# Patient Record
Sex: MALE | Race: WHITE | NOT HISPANIC OR LATINO | Employment: UNEMPLOYED | URBAN - NONMETROPOLITAN AREA
[De-identification: names, ages, dates, MRNs, and addresses within clinical notes are randomized per-mention and may not be internally consistent; named-entity substitution may affect disease eponyms.]

---

## 2017-01-16 ENCOUNTER — TRANSFERRED RECORDS (OUTPATIENT)
Dept: HEALTH INFORMATION MANAGEMENT | Facility: HOSPITAL | Age: 32
End: 2017-01-16

## 2017-01-29 ENCOUNTER — HOSPITAL ENCOUNTER (EMERGENCY)
Facility: HOSPITAL | Age: 32
Discharge: HOME | End: 2017-01-29
Attending: PHYSICIAN ASSISTANT | Admitting: PHYSICIAN ASSISTANT
Payer: COMMERCIAL

## 2017-01-29 VITALS
HEART RATE: 85 BPM | TEMPERATURE: 98 F | DIASTOLIC BLOOD PRESSURE: 97 MMHG | OXYGEN SATURATION: 95 % | SYSTOLIC BLOOD PRESSURE: 176 MMHG | RESPIRATION RATE: 16 BRPM | HEIGHT: 72 IN

## 2017-01-29 DIAGNOSIS — G43.001 MIGRAINE WITHOUT AURA AND WITH STATUS MIGRAINOSUS, NOT INTRACTABLE: ICD-10-CM

## 2017-01-29 PROCEDURE — 25000128 H RX IP 250 OP 636: Performed by: PHYSICIAN ASSISTANT

## 2017-01-29 PROCEDURE — 96374 THER/PROPH/DIAG INJ IV PUSH: CPT

## 2017-01-29 PROCEDURE — 99284 EMERGENCY DEPT VISIT MOD MDM: CPT | Performed by: PHYSICIAN ASSISTANT

## 2017-01-29 PROCEDURE — 96375 TX/PRO/DX INJ NEW DRUG ADDON: CPT

## 2017-01-29 PROCEDURE — 99284 EMERGENCY DEPT VISIT MOD MDM: CPT | Mod: 25

## 2017-01-29 PROCEDURE — 25000125 ZZHC RX 250: Performed by: PHYSICIAN ASSISTANT

## 2017-01-29 PROCEDURE — 96361 HYDRATE IV INFUSION ADD-ON: CPT

## 2017-01-29 RX ORDER — DIPHENHYDRAMINE HYDROCHLORIDE 50 MG/ML
25 INJECTION INTRAMUSCULAR; INTRAVENOUS ONCE
Status: COMPLETED | OUTPATIENT
Start: 2017-01-29 | End: 2017-01-29

## 2017-01-29 RX ORDER — SODIUM CHLORIDE 9 MG/ML
1000 INJECTION, SOLUTION INTRAVENOUS CONTINUOUS
Status: DISCONTINUED | OUTPATIENT
Start: 2017-01-29 | End: 2017-01-29 | Stop reason: HOSPADM

## 2017-01-29 RX ORDER — KETOROLAC TROMETHAMINE 30 MG/ML
30 INJECTION, SOLUTION INTRAMUSCULAR; INTRAVENOUS ONCE
Status: COMPLETED | OUTPATIENT
Start: 2017-01-29 | End: 2017-01-29

## 2017-01-29 RX ADMIN — DIPHENHYDRAMINE HYDROCHLORIDE 25 MG: 50 INJECTION, SOLUTION INTRAMUSCULAR; INTRAVENOUS at 17:56

## 2017-01-29 RX ADMIN — KETOROLAC TROMETHAMINE 30 MG: 30 INJECTION, SOLUTION INTRAMUSCULAR; INTRAVENOUS at 17:55

## 2017-01-29 RX ADMIN — SODIUM CHLORIDE 1000 ML: 9 INJECTION, SOLUTION INTRAVENOUS at 17:55

## 2017-01-29 RX ADMIN — PROCHLORPERAZINE EDISYLATE 10 MG: 5 INJECTION INTRAMUSCULAR; INTRAVENOUS at 17:56

## 2017-01-29 ASSESSMENT — ENCOUNTER SYMPTOMS
ABDOMINAL PAIN: 0
LIGHT-HEADEDNESS: 0
BRUISES/BLEEDS EASILY: 0
SHORTNESS OF BREATH: 0
PHOTOPHOBIA: 1
NAUSEA: 1
FEVER: 0
CHILLS: 0
NUMBNESS: 0
VOMITING: 1
CHEST TIGHTNESS: 0
NECK PAIN: 0
ACTIVITY CHANGE: 0
WEAKNESS: 0
DIZZINESS: 0
BACK PAIN: 0
HEADACHES: 1

## 2017-01-29 NOTE — ED NOTES
Pt c/o a migraine that started this AM. Pt states that he gets migraines 3-4x/year and this migraine is no different than previous. Pt states he has been vomiting and dizzy. Pt took 3 ASA today without relief.

## 2017-01-29 NOTE — ED PROVIDER NOTES
"  History     Chief Complaint   Patient presents with     Headache     \"I get migraines 3-4 times a year\". \"Usually just a shot in the butt helps it\". States nauseated and some vomiting from migraine.     Patient is a 31 year old male presenting with headaches. The history is provided by the patient.   Headache  Pain location:  L temporal and frontal  Quality:  Sharp  Radiates to:  Does not radiate  Severity currently:  8/10  Timing:  Constant  Progression:  Unchanged  Chronicity:  Recurrent  Similar to prior headaches: yes    Relieved by:  Aspirin  Worsened by:  Light  Associated symptoms: nausea, photophobia and vomiting    Associated symptoms: no abdominal pain, no back pain, no congestion, no dizziness, no fever, no neck pain, no numbness and no weakness      Hong Murillo is a 31 year old male who presented to the ED ambulatory for evaluation of a migraine headache.  Began around noon.  Left frontal. Typical for his usual headaches.  Tells me that when he waits to long he starts to vomit.  No falls.  No blood thinners.  No fevers or neck pain.      I have reviewed the Medications, Allergies, Past Medical and Surgical History, and Social History in the Epic system.    Review of Systems   Constitutional: Negative for fever, chills and activity change.   HENT: Negative for congestion.    Eyes: Positive for photophobia. Negative for visual disturbance.   Respiratory: Negative for chest tightness and shortness of breath.    Cardiovascular: Negative for chest pain.   Gastrointestinal: Positive for nausea and vomiting. Negative for abdominal pain.   Genitourinary: Negative.    Musculoskeletal: Negative for back pain and neck pain.   Skin: Negative.    Neurological: Positive for headaches. Negative for dizziness, weakness, light-headedness and numbness.   Hematological: Does not bruise/bleed easily.       Physical Exam   BP: 141/96 mmHg  Heart Rate: 100  Temp: 97.8  F (36.6  C)  Resp: 18  Height: 182.9 cm " (6')  SpO2: 98 %  Physical Exam   Constitutional: He is oriented to person, place, and time. He appears well-developed and well-nourished. No distress.   HENT:   Head: Normocephalic and atraumatic.   Mouth/Throat: Oropharynx is clear and moist.   Eyes: Conjunctivae and EOM are normal. Pupils are equal, round, and reactive to light.   Neck: Normal range of motion. Neck supple.   Cardiovascular: Normal rate and regular rhythm.    Pulmonary/Chest: Effort normal.   Abdominal: Soft. There is no tenderness.   Lymphadenopathy:     He has no cervical adenopathy.   Neurological: He is alert and oriented to person, place, and time.   No focal findings    Skin: Skin is warm and dry.   Psychiatric: He has a normal mood and affect.   Nursing note and vitals reviewed.      ED Course   Procedures           Medications   0.9% sodium chloride BOLUS (0 mLs Intravenous Stopped 1/29/17 1853)   prochlorperazine (COMPAZINE) injection 10 mg (10 mg Intravenous Given 1/29/17 1756)   diphenhydrAMINE (BENADRYL) injection 25 mg (25 mg Intravenous Given 1/29/17 1756)   ketorolac (TORADOL) injection 30 mg (30 mg Intravenous Given 1/29/17 1755)     Critical Care time:  none               Labs Ordered and Resulted from Time of ED Arrival Up to the Time of Departure from the ED - No data to display    Assessments & Plan (with Medical Decision Making)   Complete resolution of his symptoms after migraine cocktail.  He would like to go home. This is certainly reasonable.  No red flags.  Migraine is exactly like his previous. Discharged in stable and good condition, ambulatory without assistance.     I have reviewed the nursing notes.    I have reviewed the findings, diagnosis, plan and need for follow up with the patient.    Discharge Medication List as of 1/29/2017  7:00 PM          Final diagnoses:   Migraine without aura and with status migrainosus, not intractable       1/29/2017   [facility]      Alicia Youngblood PA-C  01/29/17  1849    Alicia Youngblood PA-C  02/01/17 1116

## 2017-01-29 NOTE — ED AVS SNAPSHOT
[facility]    [address] [address]    Phone:  [phone]                                       Hong Murillo   MRN: [id number]    Department:  [facility]   Date of Visit:  1/29/2017           Patient Information     Date Of Birth          1985        Your diagnoses for this visit were:     Migraine without aura and with status migrainosus, not intractable        You were seen by Alicia Youngblood PA-C.      Follow-up Information     Follow up with Clinic, [facility].    Why:  As needed    Contact information:    [address]  [facility] [address] [phone]          Follow up with [facility].    Specialty:  EMERGENCY MEDICINE    Why:  If symptoms worsen    Contact information:    [address] [address] [phone]    Additional information:    From [facility]: Take [address]. Turn left at [address]. Turn left at the first stoplight on [address]. At the first stop sign, take a right onto [address]. Take a left into the parking lot and continue through until you reach the North enterance of the building.       From [place]: Take [address]. Take the [address] ramp towards [place]. Turn left onto [address]. Take a slight right onto [address]. Turn left at the first stoplight on [address]. At the first stop sign, take a right onto [address]. Take a left into the parking lot and continue through until you reach the North enterance of the building.       From [place]: Take [address]. Take a right at [address]. At the first stop sign, take a right onto [address]. Take a left into the parking lot and continue through until you reach the North enterance of the building.         Discharge Instructions         * Migraine Headache  Migraine headaches are related to changes in blood flow to the brain. This causes  throbbing or constant pain on one or both sides of the head. The pain may last from a few hours to several days. There is usually nausea, vomiting, sensitivity to light and sound, and blurred vision. A migraine attack may be triggered by emotional stress, hormone changes during the menstrual cycle, oral contraceptives, alcohol use, certain foods containing tyramine, eye strain, weather changes, missing meals, or too little or too much sleep.  Home Care For This Headache:  1) If you were given pain medicine for this headache, do not drive yourself home . Arrange for a ride, instead. When you get home, try to sleep. You should feel much better when you wake up.  2) Migraine headaches may improve with an ice pack on the forehead or at the base of the skull. Heat to the back of your neck may relieve any neck spasm.  3) Drink only clear liquids or eat a very light diet to avoid nausea/vomiting until symptoms improve.  Preventing Future Headaches:  1) Pay attention to those factors that seem to trigger your headache. Try to avoid them when you can. If you have frequent headaches, it is useful to keep a diary of what you were doing, feeling or eating in the hours before each attack. Show this to your doctor to help find the cause of your headaches.  a) If you feel that stress is a factor in your headaches, look at the sources of stress in your life. Find ways to release the build-up of those stresses by using regular exercise, relaxation methods (yoga, meditation), bio-feedback or simply taking time-out for yourself. For more information about this, consult your doctor or go to a local bookstore and review books and tapes on this subject.  b) Tyramine is a substance present in the following foods : chocolate, yogurt, all cheeses except cottage cheese and cream cheese. smoked or pickled fish and meat (including herring, caviar, bologna, pepperoni, salami), liver, avocados, bananas, figs, raisins, and red wine. Be aware that  these foods may trigger a migraine in some persons. Try taking these foods out of your diet for 1-2 months to see if this reduces headache frequency.  Treating Future Attacks:  1) At the first sign of a migraine headache, take a medicine to stop it if one has been prescribed for you. If not, take acetaminophen (Tylenol) or ibuprofen (Motrin, Advil) if you are able to take these. The sooner you take medicine, the better it will work.  2) You may also want to find a quiet, dark, comfortable place to sit or lie down. Let yourself relax or sleep.  3) An ice pack on the forehead or area of greatest pain may also help.  Follow Up  with your doctor if the headache is not better within the next 24 hours. If you have frequent headaches you should discuss a treatment plan with your primary care doctor. Ask if you can have medicine to take at home the next time you get a bad headache. Poorly controlled chronic headaches may require a referral to a neurologist (headache specialist).  Get Prompt Medical Attention  if any of the following occur:    Your head pain gets worse, or does not improve within 24 hours    Repeated vomiting (can t keep liquids down)    Sinus or ear or throat pain (not already reported)    Fever of 101  F (38.3  C) or higher, or as directed by your healthcare provider    Stiff neck    Extreme drowsiness, confusion or fainting    Weakness of an arm or leg or one side of the face    Difficulty with speech or vision    6828-6672 [employer]. [address] [place] [address]. All rights reserved. This information is not intended as a substitute for professional medical care. Always follow your healthcare professional's instructions.        Follow-up in the clinic as needed.    Return here for ANY other concerns or questions.     Go home and rest.     Future Appointments        Provider Department Dept Phone Center    2/8/2017 9:00 AM Néstor Rene MD  ORTHOPEDICS [phone]  [facility]         Review of your medicines      Our records show that you are taking the medicines listed below. If these are incorrect, please call your family doctor or clinic.        Dose / Directions Last dose taken    LORazepam 1 MG tablet   Commonly known as:  ATIVAN   Dose:  1 mg   Quantity:  30 tablet        Take 1 tablet (1 mg) by mouth every 8 hours as needed for anxiety   Refills:  0        methimazole 5 MG tablet   Commonly known as:  TAPAZOLE   Dose:  5 mg   Quantity:  90 tablet        Take 1 tablet (5 mg) by mouth 3 times daily   Refills:  3        metoprolol 25 MG tablet   Commonly known as:  LOPRESSOR   Dose:  25 mg   Quantity:  60 tablet        Take 1 tablet (25 mg) by mouth 2 times daily   Refills:  1        sertraline 50 MG tablet   Commonly known as:  ZOLOFT   Quantity:  30 tablet        Take 1/2 pill for 4 days then one pill daily there after.   Refills:  0        sulindac 200 MG tablet   Commonly known as:  CLINORIL   Dose:  200 mg   Quantity:  60 tablet        Take 1 tablet (200 mg) by mouth 2 times daily (with meals)   Refills:  1        TYLENOL PO   Dose:  1000 mg        Take 1,000 mg by mouth   Refills:  0                Procedures and tests performed during your visit     Peripheral IV catheter      Orders Needing Specimen Collection     None      Pending Results     No orders found from 1/28/2017 to 1/30/2017.            Pending Culture Results     No orders found from 1/28/2017 to 1/30/2017.            Thank you for choosing [facility]       Thank you for choosing [facility] for your care. Our goal is always to provide you with excellent care. Hearing back from our patients is one way we can continue to improve our services. Please take a few minutes to complete the written survey that you may receive in the mail after you visit with us. Thank you!        [employer] Information     [employer] lets you send messages to your doctor, view your test results, renew your prescriptions, schedule  "appointments and more. To sign up, go to [email or web address] . Click on \"Log in\" on the left side of the screen, which will take you to the Welcome page. Then click on \"Sign up Now\" on the right side of the page.     You will be asked to enter the access code listed below, as well as some personal information. Please follow the directions to create your username and password.     Your access code is: [email or web address]  Expires: 3/12/2017 10:00 AM     Your access code will  in 90 days. If you need help or a new code, please call your [place] clinic or [phone].        Care EveryWhere ID     This is your Care EveryWhere ID. This could be used by other organizations to access your [place] medical records  [id number]        After Visit Summary       This is your record. Keep this with you and show to your community pharmacist(s) and doctor(s) at your next visit.                  "

## 2017-01-29 NOTE — ED AVS SNAPSHOT
[facility]    [address] [address]    Phone:  [phone]                                       Hong Murillo   MRN: [id number]    Department:  [facility]   Date of Visit:  1/29/2017           After Visit Summary Signature Page     I have received my discharge instructions, and my questions have been answered. I have discussed any challenges I see with this plan with the nurse or doctor.    ..........................................................................................................................................  Patient/Patient Representative Signature      ..........................................................................................................................................  Patient Representative Print Name and Relationship to Patient    ..................................................               ................................................  Date                                            Time    ..........................................................................................................................................  Reviewed by Signature/Title    ...................................................              ..............................................  Date                                                            Time

## 2017-01-30 NOTE — DISCHARGE INSTRUCTIONS
* Migraine Headache  Migraine headaches are related to changes in blood flow to the brain. This causes throbbing or constant pain on one or both sides of the head. The pain may last from a few hours to several days. There is usually nausea, vomiting, sensitivity to light and sound, and blurred vision. A migraine attack may be triggered by emotional stress, hormone changes during the menstrual cycle, oral contraceptives, alcohol use, certain foods containing tyramine, eye strain, weather changes, missing meals, or too little or too much sleep.  Home Care For This Headache:  1) If you were given pain medicine for this headache, do not drive yourself home . Arrange for a ride, instead. When you get home, try to sleep. You should feel much better when you wake up.  2) Migraine headaches may improve with an ice pack on the forehead or at the base of the skull. Heat to the back of your neck may relieve any neck spasm.  3) Drink only clear liquids or eat a very light diet to avoid nausea/vomiting until symptoms improve.  Preventing Future Headaches:  1) Pay attention to those factors that seem to trigger your headache. Try to avoid them when you can. If you have frequent headaches, it is useful to keep a diary of what you were doing, feeling or eating in the hours before each attack. Show this to your doctor to help find the cause of your headaches.  a) If you feel that stress is a factor in your headaches, look at the sources of stress in your life. Find ways to release the build-up of those stresses by using regular exercise, relaxation methods (yoga, meditation), bio-feedback or simply taking time-out for yourself. For more information about this, consult your doctor or go to a local bookstore and review books and tapes on this subject.  b) Tyramine is a substance present in the following foods : chocolate, yogurt, all cheeses except cottage cheese and cream cheese. smoked or pickled fish and meat (including herring,  caviar, bologna, pepperoni, salami), liver, avocados, bananas, figs, raisins, and red wine. Be aware that these foods may trigger a migraine in some persons. Try taking these foods out of your diet for 1-2 months to see if this reduces headache frequency.  Treating Future Attacks:  1) At the first sign of a migraine headache, take a medicine to stop it if one has been prescribed for you. If not, take acetaminophen (Tylenol) or ibuprofen (Motrin, Advil) if you are able to take these. The sooner you take medicine, the better it will work.  2) You may also want to find a quiet, dark, comfortable place to sit or lie down. Let yourself relax or sleep.  3) An ice pack on the forehead or area of greatest pain may also help.  Follow Up  with your doctor if the headache is not better within the next 24 hours. If you have frequent headaches you should discuss a treatment plan with your primary care doctor. Ask if you can have medicine to take at home the next time you get a bad headache. Poorly controlled chronic headaches may require a referral to a neurologist (headache specialist).  Get Prompt Medical Attention  if any of the following occur:    Your head pain gets worse, or does not improve within 24 hours    Repeated vomiting (can t keep liquids down)    Sinus or ear or throat pain (not already reported)    Fever of 101  F (38.3  C) or higher, or as directed by your healthcare provider    Stiff neck    Extreme drowsiness, confusion or fainting    Weakness of an arm or leg or one side of the face    Difficulty with speech or vision    1847-8201 [employer]. [address] [place] [address], [place] [address]. All rights reserved. This information is not intended as a substitute for professional medical care. Always follow your healthcare professional's instructions.        Follow-up in the clinic as needed.    Return here for ANY other concerns or questions.     Go home and rest.

## 2017-01-30 NOTE — ED NOTES
Discharge instructions reviewed with patient, and patient verbalized understanding. Pt ambulated with a steady gait to the exit

## 2017-02-20 DIAGNOSIS — E05.00 GRAVES' DISEASE: Primary | ICD-10-CM

## 2017-02-20 LAB
T4 FREE SERPL-MCNC: 0.87 NG/DL (ref 0.76–1.46)
TSH SERPL DL<=0.05 MIU/L-ACNC: <0.01 MU/L (ref 0.4–4)

## 2017-02-20 PROCEDURE — 84439 ASSAY OF FREE THYROXINE: CPT | Performed by: INTERNAL MEDICINE

## 2017-02-20 PROCEDURE — 36415 COLL VENOUS BLD VENIPUNCTURE: CPT | Performed by: INTERNAL MEDICINE

## 2017-02-20 PROCEDURE — 84443 ASSAY THYROID STIM HORMONE: CPT | Performed by: INTERNAL MEDICINE

## 2017-03-06 ENCOUNTER — OFFICE VISIT (OUTPATIENT)
Dept: FAMILY MEDICINE | Facility: OTHER | Age: 32
End: 2017-03-06
Attending: PHYSICIAN ASSISTANT
Payer: COMMERCIAL

## 2017-03-06 VITALS
SYSTOLIC BLOOD PRESSURE: 118 MMHG | BODY MASS INDEX: 32.37 KG/M2 | HEART RATE: 99 BPM | OXYGEN SATURATION: 96 % | DIASTOLIC BLOOD PRESSURE: 70 MMHG | HEIGHT: 72 IN | WEIGHT: 239 LBS | TEMPERATURE: 97.8 F

## 2017-03-06 DIAGNOSIS — E05.00 GRAVES DISEASE: ICD-10-CM

## 2017-03-06 DIAGNOSIS — F41.1 GAD (GENERALIZED ANXIETY DISORDER): Primary | ICD-10-CM

## 2017-03-06 DIAGNOSIS — E05.90 HYPERTHYROIDISM: ICD-10-CM

## 2017-03-06 LAB
T4 FREE SERPL-MCNC: 0.32 NG/DL (ref 0.76–1.46)
TSH SERPL DL<=0.05 MIU/L-ACNC: 0.26 MU/L (ref 0.4–4)

## 2017-03-06 PROCEDURE — 84443 ASSAY THYROID STIM HORMONE: CPT | Performed by: INTERNAL MEDICINE

## 2017-03-06 PROCEDURE — 84439 ASSAY OF FREE THYROXINE: CPT | Performed by: INTERNAL MEDICINE

## 2017-03-06 PROCEDURE — 36415 COLL VENOUS BLD VENIPUNCTURE: CPT | Performed by: INTERNAL MEDICINE

## 2017-03-06 PROCEDURE — 99214 OFFICE O/P EST MOD 30 MIN: CPT | Performed by: PHYSICIAN ASSISTANT

## 2017-03-06 RX ORDER — LORAZEPAM 1 MG/1
1 TABLET ORAL EVERY 8 HOURS PRN
Qty: 30 TABLET | Refills: 0 | Status: SHIPPED | OUTPATIENT
Start: 2017-03-06 | End: 2023-03-09

## 2017-03-06 ASSESSMENT — ANXIETY QUESTIONNAIRES
5. BEING SO RESTLESS THAT IT IS HARD TO SIT STILL: MORE THAN HALF THE DAYS
2. NOT BEING ABLE TO STOP OR CONTROL WORRYING: MORE THAN HALF THE DAYS
GAD7 TOTAL SCORE: 18
IF YOU CHECKED OFF ANY PROBLEMS ON THIS QUESTIONNAIRE, HOW DIFFICULT HAVE THESE PROBLEMS MADE IT FOR YOU TO DO YOUR WORK, TAKE CARE OF THINGS AT HOME, OR GET ALONG WITH OTHER PEOPLE: SOMEWHAT DIFFICULT
1. FEELING NERVOUS, ANXIOUS, OR ON EDGE: NEARLY EVERY DAY
7. FEELING AFRAID AS IF SOMETHING AWFUL MIGHT HAPPEN: MORE THAN HALF THE DAYS
6. BECOMING EASILY ANNOYED OR IRRITABLE: NEARLY EVERY DAY
3. WORRYING TOO MUCH ABOUT DIFFERENT THINGS: NEARLY EVERY DAY

## 2017-03-06 ASSESSMENT — PATIENT HEALTH QUESTIONNAIRE - PHQ9: 5. POOR APPETITE OR OVEREATING: NEARLY EVERY DAY

## 2017-03-06 ASSESSMENT — PAIN SCALES - GENERAL: PAINLEVEL: NO PAIN (0)

## 2017-03-06 NOTE — PATIENT INSTRUCTIONS
Thank you for choosing [facility].   I appreciate the opportunity to serve you and look forward to supporting your healthcare needs in the future. Please contact me with any questions or concerns that you may have.    Sincerely,    Desiree Loomis RN, PA-C

## 2017-03-06 NOTE — NURSING NOTE
Chief Complaint   Patient presents with     Anxiety       Initial /70 (BP Location: Right arm, Patient Position: Chair, Cuff Size: Adult Large)  Pulse 99  Temp 97.8  F (36.6  C) (Tympanic)  Ht 6' (1.829 m)  Wt 239 lb (108.4 kg)  SpO2 96%  BMI 32.41 kg/m2 Estimated body mass index is 32.41 kg/(m^2) as calculated from the following:    Height as of this encounter: 6' (1.829 m).    Weight as of this encounter: 239 lb (108.4 kg).  Medication Reconciliation: complete     CHRISTIANE SHUKLA

## 2017-03-06 NOTE — MR AVS SNAPSHOT
"              After Visit Summary   3/6/2017    Hong Murillo    MRN: [id number]           Patient Information     Date Of Birth          1985        Visit Information        Provider Department      3/6/2017 9:15 AM Desiree Loomis PA [facility]        Today's Diagnoses     NORM (generalized anxiety disorder)    -  1    Hyperthyroidism        Graves disease          Care Instructions    Thank you for choosing [facility].   I appreciate the opportunity to serve you and look forward to supporting your healthcare needs in the future. Please contact me with any questions or concerns that you may have.    Sincerely,    Desiree Loomis RN, PA-C           Follow-ups after your visit        Who to contact     If you have questions or need follow up information about today's clinic visit or your schedule please contact [facility] directly at [phone].  Normal or non-critical lab and imaging results will be communicated to you by MyChart, letter or phone within 4 business days after the clinic has received the results. If you do not hear from us within 7 days, please contact the clinic through MyChart or phone. If you have a critical or abnormal lab result, we will notify you by phone as soon as possible.  Submit refill requests through [employer] or call your pharmacy and they will forward the refill request to us. Please allow 3 business days for your refill to be completed.          Additional Information About Your Visit        MyChart Information     [employer] lets you send messages to your doctor, view your test results, renew your prescriptions, schedule appointments and more. To sign up, go to [email or web address] . Click on \"Log in\" on the left side of the screen, which will take you to the Welcome page. Then click on \"Sign up Now\" on the right side of the page.     You will be asked to enter the access code listed below, as well as some personal information. " Please follow the directions to create your username and password.     Your access code is: [email or web address]  Expires: 3/12/2017 10:00 AM     Your access code will  in 90 days. If you need help or a new code, please call your [place] clinic or [phone].        Care EveryWhere ID     This is your Care EveryWhere ID. This could be used by other organizations to access your [place] medical records  [id number]        Your Vitals Were     Pulse Temperature Height Pulse Oximetry BMI (Body Mass Index)       99 97.8  F (36.6  C) (Tympanic) 6' (1.829 m) 96% 32.41 kg/m2        Blood Pressure from Last 3 Encounters:   17 118/70   17 176/97   16 116/60    Weight from Last 3 Encounters:   17 239 lb (108.4 kg)   16 229 lb (103.9 kg)   16 202 lb (91.6 kg)              Today, you had the following     No orders found for display         Today's Medication Changes          These changes are accurate as of: 3/6/17  9:47 AM.  If you have any questions, ask your nurse or doctor.               Stop taking these medicines if you haven't already. Please contact your care team if you have questions.     methimazole 5 MG tablet   Commonly known as:  TAPAZOLE   Stopped by:  Desiree Loomis PA                Where to get your medicines      Some of these will need a paper prescription and others can be bought over the counter.  Ask your nurse if you have questions.     Bring a paper prescription for each of these medications     LORazepam 1 MG tablet                Primary Care Provider Office Phone # Fax #    FRANCO Bronw [phone]       [facility] [address] [address]        Thank you!     Thank you for choosing [facility]  for your care. Our goal is always to provide you with excellent care. Hearing back from our patients is one way we can continue to improve our services. Please take a few minutes to complete the written  survey that you may receive in the mail after your visit with us. Thank you!             Your Updated Medication List - Protect others around you: Learn how to safely use, store and throw away your medicines at www.disposemymeds.org.          This list is accurate as of: 3/6/17  9:47 AM.  Always use your most recent med list.                   Brand Name Dispense Instructions for use    LORazepam 1 MG tablet    ATIVAN    30 tablet    Take 1 tablet (1 mg) by mouth every 8 hours as needed for anxiety

## 2017-03-06 NOTE — PROGRESS NOTES
SUBJECTIVE:                                                    Hong Murillo is a 31 year old male who presents to clinic today for the following health issues:      Anxiety Follow-Up    Status since last visit: No change- still high anxiety     Other associated symptoms:troubles sleeping, knot in stomach    Complicating factors:   Significant life event: Yes-  Death in family, moving to [place] in a couple days.    Current substance abuse: None  Depression symptoms: No  NORM-7 SCORE 12/15/2016   Total Score 15        GAD7                   Amount of exercise or physical activity: 4-5 days/week for an average of greater than 60 minutes    Problems taking medications regularly: No    Medication side effects: none  Diet: regular (no restrictions)        Problem list and histories reviewed & adjusted, as indicated.  Additional history: as documented    Patient Active Problem List   Diagnosis     Ilio-inguinal strain     Leukocytosis     Hypokalemia     Migraine without aura and with status migrainosus, not intractable     ACP (advance care planning)     History reviewed. No pertinent past surgical history.    Social History   Substance Use Topics     Smoking status: Former Smoker     Packs/day: 0.00     Years: 15.00     Smokeless tobacco: Never Used     Alcohol use No      Comment: rarely     History reviewed. No pertinent family history.      Current Outpatient Prescriptions   Medication Sig Dispense Refill     LORazepam (ATIVAN) 1 MG tablet Take 1 tablet (1 mg) by mouth every 8 hours as needed for anxiety 30 tablet 0     No Known Allergies  BP Readings from Last 3 Encounters:   03/06/17 118/70   01/29/17 176/97   12/27/16 116/60    Wt Readings from Last 3 Encounters:   03/06/17 239 lb (108.4 kg)   12/27/16 229 lb (103.9 kg)   12/22/16 202 lb (91.6 kg)                    Reviewed and updated as needed this visit by clinical staff  Tobacco  Allergies  Meds  Med Hx  Surg Hx  Fam Hx  Soc Hx      Reviewed and  updated as needed this visit by Provider         ROS:  Constitutional, neuro, ENT, endocrine, pulmonary, cardiac, gastrointestinal, genitourinary, musculoskeletal, integument and psychiatric systems are negative, except as otherwise noted.    OBJECTIVE:                                                    /70 (BP Location: Right arm, Patient Position: Chair, Cuff Size: Adult Large)  Pulse 99  Temp 97.8  F (36.6  C) (Tympanic)  Ht 6' (1.829 m)  Wt 239 lb (108.4 kg)  SpO2 96%  BMI 32.41 kg/m2  Body mass index is 32.41 kg/(m^2).  GENERAL APPEARANCE: healthy, alert and no distress  EYES: Eyes grossly normal to inspection, PERRL and conjunctivae and sclerae normal  HENT: ear canals and TM's normal and nose and mouth without ulcers or lesions  NECK: no adenopathy, no asymmetry, masses, or scars and thyroid normal to palpation  RESP: lungs clear to auscultation - no rales, rhonchi or wheezes  CV: regular rates and rhythm, normal S1 S2, no S3 or S4 and no murmur, click or rub  LYMPHATICS: normal ant/post cervical and supraclavicular nodes  ABDOMEN: soft, nontender, without hepatosplenomegaly or masses and bowel sounds normal  MS: extremities normal- no gross deformities noted  SKIN: no suspicious lesions or rashes  PSYCH: mentation appears normal and he is stressed. Not tearful today.   Discussion on Zoloft.   Emotional.  Worry overwhelms him.   Diagnostic test results:  Diagnostic Test Results:  No results found for this or any previous visit (from the past 24 hour(s)).     ASSESSMENT/PLAN:                                                    1. Hyperthyroidism  He was ablated and now waiting for his levels to change.  Saw endocrine in [place].   He is moving to [place].   Be near family.   He would like his records. We will have him see med records to get his records to go with.   - LORazepam (ATIVAN) 1 MG tablet; Take 1 tablet (1 mg) by mouth every 8 hours as needed for anxiety  Dispense: 30 tablet; Refill:  0    2. NORM (generalized anxiety disorder)  He doesn't want to take Zoloft.  He does not want to do any counseling.  He has chronic Anxiety and use of THC is seeming his most effective form of help in his anxiety in the past and since his Graves doesn't help as much.   I did refill the Ativan for the move.  He will use this as dicussed and establish once he gets settled.       See Patient Instructions    Desiree Loomis PA-C  [facility] [place]    30 minutes in visit over 50 % in counseling.

## 2017-03-07 ASSESSMENT — ANXIETY QUESTIONNAIRES: GAD7 TOTAL SCORE: 18

## 2017-03-07 ASSESSMENT — PATIENT HEALTH QUESTIONNAIRE - PHQ9: SUM OF ALL RESPONSES TO PHQ QUESTIONS 1-9: 2

## 2019-11-03 ENCOUNTER — HOSPITAL ENCOUNTER (EMERGENCY)
Facility: OTHER | Age: 34
Discharge: HOME | End: 2019-11-03
Attending: EMERGENCY MEDICINE | Admitting: EMERGENCY MEDICINE

## 2019-11-03 ENCOUNTER — APPOINTMENT (OUTPATIENT)
Dept: CT IMAGING | Facility: OTHER | Age: 34
End: 2019-11-03
Attending: EMERGENCY MEDICINE

## 2019-11-03 VITALS
BODY MASS INDEX: 31.53 KG/M2 | OXYGEN SATURATION: 96 % | RESPIRATION RATE: 20 BRPM | TEMPERATURE: 98.1 F | SYSTOLIC BLOOD PRESSURE: 119 MMHG | DIASTOLIC BLOOD PRESSURE: 97 MMHG | HEIGHT: 73 IN

## 2019-11-03 DIAGNOSIS — G44.219 EPISODIC TENSION-TYPE HEADACHE, NOT INTRACTABLE: ICD-10-CM

## 2019-11-03 LAB
ANION GAP SERPL CALCULATED.3IONS-SCNC: 8 MMOL/L (ref 3–14)
BASOPHILS # BLD AUTO: 0.1 10E9/L (ref 0–0.2)
BASOPHILS NFR BLD AUTO: 0.7 %
BUN SERPL-MCNC: 18 MG/DL (ref 7–25)
CALCIUM SERPL-MCNC: 9.6 MG/DL (ref 8.6–10.3)
CHLORIDE SERPL-SCNC: 102 MMOL/L (ref 98–107)
CO2 SERPL-SCNC: 26 MMOL/L (ref 21–31)
CREAT SERPL-MCNC: 1 MG/DL (ref 0.7–1.3)
DIFFERENTIAL METHOD BLD: NORMAL
EOSINOPHIL # BLD AUTO: 0.2 10E9/L (ref 0–0.7)
EOSINOPHIL NFR BLD AUTO: 1.7 %
ERYTHROCYTE [DISTWIDTH] IN BLOOD BY AUTOMATED COUNT: 12.2 % (ref 10–15)
GFR SERPL CREATININE-BSD FRML MDRD: 85 ML/MIN/{1.73_M2}
GLUCOSE SERPL-MCNC: 155 MG/DL (ref 70–105)
HCT VFR BLD AUTO: 43.1 % (ref 40–53)
HGB BLD-MCNC: 14.4 G/DL (ref 13.3–17.7)
IMM GRANULOCYTES # BLD: 0 10E9/L (ref 0–0.4)
IMM GRANULOCYTES NFR BLD: 0.4 %
INR PPP: 1 (ref 0–1.3)
LYMPHOCYTES # BLD AUTO: 3 10E9/L (ref 0.8–5.3)
LYMPHOCYTES NFR BLD AUTO: 31.3 %
MCH RBC QN AUTO: 30 PG (ref 26.5–33)
MCHC RBC AUTO-ENTMCNC: 33.4 G/DL (ref 31.5–36.5)
MCV RBC AUTO: 90 FL (ref 78–100)
MONOCYTES # BLD AUTO: 0.6 10E9/L (ref 0–1.3)
MONOCYTES NFR BLD AUTO: 6.2 %
NEUTROPHILS # BLD AUTO: 5.8 10E9/L (ref 1.6–8.3)
NEUTROPHILS NFR BLD AUTO: 59.7 %
PLATELET # BLD AUTO: 205 10E9/L (ref 150–450)
POTASSIUM SERPL-SCNC: 3.8 MMOL/L (ref 3.5–5.1)
RBC # BLD AUTO: 4.8 10E12/L (ref 4.4–5.9)
SODIUM SERPL-SCNC: 136 MMOL/L (ref 134–144)
TROPONIN I SERPL-MCNC: 2.9 PG/ML
WBC # BLD AUTO: 9.7 10E9/L (ref 4–11)

## 2019-11-03 PROCEDURE — 99285 EMERGENCY DEPT VISIT HI MDM: CPT | Mod: 25 | Performed by: EMERGENCY MEDICINE

## 2019-11-03 PROCEDURE — 25500064 ZZH RX 255 OP 636: Performed by: EMERGENCY MEDICINE

## 2019-11-03 PROCEDURE — 96374 THER/PROPH/DIAG INJ IV PUSH: CPT | Mod: XU | Performed by: EMERGENCY MEDICINE

## 2019-11-03 PROCEDURE — 80048 BASIC METABOLIC PNL TOTAL CA: CPT | Performed by: EMERGENCY MEDICINE

## 2019-11-03 PROCEDURE — 70498 CT ANGIOGRAPHY NECK: CPT | Mod: TC

## 2019-11-03 PROCEDURE — 36415 COLL VENOUS BLD VENIPUNCTURE: CPT | Performed by: EMERGENCY MEDICINE

## 2019-11-03 PROCEDURE — 84484 ASSAY OF TROPONIN QUANT: CPT | Performed by: EMERGENCY MEDICINE

## 2019-11-03 PROCEDURE — 85610 PROTHROMBIN TIME: CPT | Performed by: EMERGENCY MEDICINE

## 2019-11-03 PROCEDURE — 25000128 H RX IP 250 OP 636: Performed by: EMERGENCY MEDICINE

## 2019-11-03 PROCEDURE — 85025 COMPLETE CBC W/AUTO DIFF WBC: CPT | Performed by: EMERGENCY MEDICINE

## 2019-11-03 PROCEDURE — 96375 TX/PRO/DX INJ NEW DRUG ADDON: CPT | Mod: XU | Performed by: EMERGENCY MEDICINE

## 2019-11-03 PROCEDURE — 99284 EMERGENCY DEPT VISIT MOD MDM: CPT | Mod: Z6 | Performed by: EMERGENCY MEDICINE

## 2019-11-03 PROCEDURE — 25800030 ZZH RX IP 258 OP 636: Performed by: EMERGENCY MEDICINE

## 2019-11-03 RX ORDER — SODIUM CHLORIDE 9 MG/ML
INJECTION, SOLUTION INTRAVENOUS CONTINUOUS
Status: DISCONTINUED | OUTPATIENT
Start: 2019-11-03 | End: 2019-11-03 | Stop reason: HOSPADM

## 2019-11-03 RX ORDER — DIPHENHYDRAMINE HYDROCHLORIDE 50 MG/ML
25 INJECTION INTRAMUSCULAR; INTRAVENOUS ONCE
Status: COMPLETED | OUTPATIENT
Start: 2019-11-03 | End: 2019-11-03

## 2019-11-03 RX ADMIN — IOHEXOL 100 ML: 350 INJECTION, SOLUTION INTRAVENOUS at 12:37

## 2019-11-03 RX ADMIN — DIPHENHYDRAMINE HYDROCHLORIDE 25 MG: 50 INJECTION INTRAMUSCULAR; INTRAVENOUS at 11:47

## 2019-11-03 RX ADMIN — PROCHLORPERAZINE EDISYLATE 10 MG: 5 INJECTION INTRAMUSCULAR; INTRAVENOUS at 11:47

## 2019-11-03 RX ADMIN — SODIUM CHLORIDE: 9 INJECTION, SOLUTION INTRAVENOUS at 11:47

## 2019-11-03 NOTE — PROGRESS NOTES
1.  Has the patient had a previous reaction to IV contrast? No    2.  Does the patient have kidney disease? No    3.  Is the patient on dialysis? No    If YES to any of these questions, exam will be reviewed with a Radiologist before administering contrast.

## 2019-11-03 NOTE — ED PROVIDER NOTES
"[facility]  Emergency Department Visit Note    Headache      History of Present Illness     HPI:  Hong Murillo is a 34 year old male presenting with headache. These symptoms started 14 hours ago and the onset was while having sex.  The headache quality is throbbing pain, dull pain. The patient does have a history of migraines but this is not similar to his previous headaches. There is  associated nausea, vomiting, photophobia, phonophobia, but no numbness, weakness, double vision, blurry vision. For headache relief, the patient has tried nothing.  No recent trauma or illness. No fevers.    Medications:  Prior to Admission medications    Medication Sig Last Dose Taking? Auth Provider   LORazepam (ATIVAN) 1 MG tablet Take 1 tablet (1 mg) by mouth every 8 hours as needed for anxiety Unknown at Unknown time  Desiree Loomis PA       Allergies:  No Known Allergies    Problem List:  Patient Active Problem List   Diagnosis     Ilio-inguinal strain     Leukocytosis     Hypokalemia     Migraine without aura and with status migrainosus, not intractable     ACP (advance care planning)     Graves disease     NORM (generalized anxiety disorder)       Past Medical History:  No past medical history on file.    Past Surgical History:  No past surgical history on file.    Social History:  Social History     Tobacco Use     Smoking status: Former Smoker     Packs/day: 0.00     Years: 15.00     Pack years: 0.00     Smokeless tobacco: Never Used   Substance Use Topics     Alcohol use: No     Comment: rarely     Drug use: Yes     Comment: marijuana- last time used yesterday       Review of Systems:  10 point review of systems obtained and pertinent positive and negative findings noted in HPI. Review of systems otherwise negative.      Physical Exam     Vital signs: BP (!) 119/97   Temp 98.1  F (36.7  C) (Tympanic)   Resp 20   Ht 1.854 m (6' 1\")   SpO2 96%   BMI 31.53 kg/m      Physical Exam:    General: " awake and alert, uncomfortable  HEENT: PERRL, EOMI, no papilledema or hemorrhages atraumatic, no scleral injection, no nasal discharge, neck supple  Chest: clear to auscultation bilaterally without wheezes or crackles, non labored respirations  Cardiovascular: regular rate and rhythm, no murmurs or gallops  Abdomen: soft, nontender, no rebound or guarding, nondistended  Extremities: no deformities, edema, or tenderness  Skin: warm, dry, no rashes  Neuro: alert and oriented x 3, moving extremities x 4, walks on heels and toes without difficulty, CN II-XII intact, hand  5/5 b/l      Medical Decision Making & ED Course     Hong Murillo is a 34 year old male presenting with headache. Differential includes migraine, tension headache, cluster headache, intracranial hemorrhage, intracranial mass, meningitis, sinusitis, venous sinus thrombosis, pseudotumor cerebri. Given the patient's history of acute headache during an intercourse a CTA head and neck was ordered. As this was negative and with benign neurologic exam, the etiology of this patient's headache is likely a primary headache disorder and does not require further emergent diagnostic evaluation. This was treated in the emergency department with compazine and benadryl  with subsequent improvement in symptoms. The patient was discharged with recommendations to follow up with a primary care provider. Patient given instructions on follow-up and warning signs for which to return to ED. All questions were answered and the patient is comfortable with plan for discharge. The patient was discharged in stable condition.    I have reviewed the patient's medical record, laboratory studies and diagnostic imaging.    Diagnosis & Disposition     Diagnosis:  1. Episodic tension-type headache, not intractable        Disposition:  Home    MD Demetrius Rodriguez Theresa M, MD  11/03/19 4190

## 2019-11-03 NOTE — ED TRIAGE NOTES
Patient developed a headache last night during intercourse that has not gotten any better. Patient also complains of nausea intermittently. Pain 7/10. Mostly on left side of his head. Patient has a history of migraines but this feels different.

## 2022-06-12 ENCOUNTER — HOSPITAL ENCOUNTER (EMERGENCY)
Facility: OTHER | Age: 37
Discharge: HOME | End: 2022-06-12
Attending: FAMILY MEDICINE | Admitting: FAMILY MEDICINE
Payer: COMMERCIAL

## 2022-06-12 VITALS
HEART RATE: 70 BPM | SYSTOLIC BLOOD PRESSURE: 159 MMHG | OXYGEN SATURATION: 98 % | DIASTOLIC BLOOD PRESSURE: 103 MMHG | TEMPERATURE: 97.5 F | RESPIRATION RATE: 18 BRPM

## 2022-06-12 DIAGNOSIS — R51.9 NONINTRACTABLE HEADACHE, UNSPECIFIED CHRONICITY PATTERN, UNSPECIFIED HEADACHE TYPE: ICD-10-CM

## 2022-06-12 PROCEDURE — 99283 EMERGENCY DEPT VISIT LOW MDM: CPT | Performed by: FAMILY MEDICINE

## 2022-06-12 PROCEDURE — 250N000011 HC RX IP 250 OP 636: Performed by: FAMILY MEDICINE

## 2022-06-12 PROCEDURE — 258N000003 HC RX IP 258 OP 636: Performed by: FAMILY MEDICINE

## 2022-06-12 PROCEDURE — 96374 THER/PROPH/DIAG INJ IV PUSH: CPT | Performed by: FAMILY MEDICINE

## 2022-06-12 PROCEDURE — 99284 EMERGENCY DEPT VISIT MOD MDM: CPT | Mod: 25 | Performed by: FAMILY MEDICINE

## 2022-06-12 PROCEDURE — 96375 TX/PRO/DX INJ NEW DRUG ADDON: CPT | Performed by: FAMILY MEDICINE

## 2022-06-12 RX ORDER — DIPHENHYDRAMINE HYDROCHLORIDE 50 MG/ML
25 INJECTION INTRAMUSCULAR; INTRAVENOUS ONCE
Status: COMPLETED | OUTPATIENT
Start: 2022-06-12 | End: 2022-06-12

## 2022-06-12 RX ADMIN — SODIUM CHLORIDE 1000 ML: 900 INJECTION, SOLUTION INTRAVENOUS at 04:59

## 2022-06-12 RX ADMIN — PROCHLORPERAZINE EDISYLATE 10 MG: 5 INJECTION INTRAMUSCULAR; INTRAVENOUS at 04:58

## 2022-06-12 RX ADMIN — DIPHENHYDRAMINE HYDROCHLORIDE 25 MG: 50 INJECTION, SOLUTION INTRAMUSCULAR; INTRAVENOUS at 04:59

## 2022-06-12 ASSESSMENT — ENCOUNTER SYMPTOMS
CHEST TIGHTNESS: 0
FEVER: 0
HEADACHES: 1
FLANK PAIN: 0
CHILLS: 0
SEIZURES: 0

## 2022-06-12 NOTE — ED TRIAGE NOTES
Arrived from home via private vehicle.  CC of severe migraine.  Hx of migraines.  No meds taken at home.       Triage Assessment     Row Name 06/12/22 0441       Triage Assessment (Adult)    Airway WDL WDL       Respiratory WDL    Respiratory WDL WDL       Skin Circulation/Temperature WDL    Skin Circulation/Temperature WDL WDL       Cardiac WDL    Cardiac WDL WDL       Peripheral/Neurovascular WDL    Peripheral Neurovascular WDL WDL       Cognitive/Neuro/Behavioral WDL    Cognitive/Neuro/Behavioral WDL WDL

## 2022-06-12 NOTE — ED PROVIDER NOTES
History     Chief Complaint   Patient presents with     Headache     HPI  Hong Murillo is a 37 year old male with history of severe migraines who presents emergency department with a significant migraine.  Unsure of trigger.  Patient thinks he may have had headaches as bad in the past but has not had one in quite some time.  No sudden onset or thunderclap in nature.  No recent trauma or fevers.  Arrived from home via private vehicle.  CC of severe migraine.  Hx of migraines.  No meds taken at home  Allergies:  No Known Allergies    Problem List:    There are no problems to display for this patient.       Past Medical History:    Migraines    Past Surgical History:    No past surgical history on file.    Family History:    No family history on file.    Social History:  Marital Status:   [4]        Medications:    No current outpatient medications on file.        Review of Systems   Constitutional: Negative for chills and fever.   Respiratory: Negative for chest tightness.    Genitourinary: Negative for flank pain.   Neurological: Positive for headaches. Negative for seizures.       Physical Exam   BP: (!) 159/103  Pulse: 70  Temp: 97.5  F (36.4  C)  Resp: 18  SpO2: 98 %      Physical Exam  Vitals and nursing note reviewed.   Cardiovascular:      Rate and Rhythm: Normal rate.   Pulmonary:      Effort: Pulmonary effort is normal. No respiratory distress.   Musculoskeletal:      Cervical back: Normal range of motion. No rigidity.   Neurological:      General: No focal deficit present.      Mental Status: He is alert.         ED Course        No results found for this or any previous visit (from the past 24 hour(s)).    Medications   prochlorperazine (COMPAZINE) injection 10 mg (10 mg Intravenous Given 6/12/22 1507)   diphenhydrAMINE (BENADRYL) injection 25 mg (25 mg Intravenous Given 6/12/22 2959)   0.9% sodium chloride BOLUS (1,000 mLs Intravenous New Bag 6/12/22 0459)       Assessments & Plan (with  Medical Decision Making)     I have reviewed the nursing notes.    I have reviewed the findings, diagnosis, plan and need for follow up with the patient.  6:49 AM--patient reports complete resolution of his headache after fluids Compazine and Benadryl above.    Differential diagnosis for his headache includes migraine, tension headache, CVA, infection, malignancy among others.  Patient does not have specific indication for neuroimaging at this time given the fact that this is similar to previous migraines albeit he has not had 1 in some time and the fact that he had excellent response to conservative management.  Patient asking to go home, return to the emergency department worsening symptoms such as fever, persistent vomiting, abrupt recurrence of headache or severe light sensitivity.  Patient verbalized understanding plan is agreement he left the ED in improving condition.    New Prescriptions    No medications on file       Final diagnoses:   Nonintractable headache, unspecified chronicity pattern, unspecified headache type       6/12/2022   [facility]     Néstor Aldridge MD  06/12/22 4851

## 2023-03-09 ENCOUNTER — OFFICE VISIT (OUTPATIENT)
Dept: FAMILY MEDICINE | Facility: OTHER | Age: 38
End: 2023-03-09
Attending: PHYSICIAN ASSISTANT
Payer: COMMERCIAL

## 2023-03-09 VITALS
TEMPERATURE: 98.4 F | HEART RATE: 108 BPM | DIASTOLIC BLOOD PRESSURE: 95 MMHG | BODY MASS INDEX: 38.52 KG/M2 | WEIGHT: 292 LBS | SYSTOLIC BLOOD PRESSURE: 159 MMHG | RESPIRATION RATE: 16 BRPM | OXYGEN SATURATION: 96 %

## 2023-03-09 DIAGNOSIS — E05.90 HYPERTHYROIDISM: ICD-10-CM

## 2023-03-09 DIAGNOSIS — E06.0 THYROIDITIS, ACUTE: Primary | ICD-10-CM

## 2023-03-09 LAB
BASOPHILS # BLD AUTO: 0.1 10E3/UL (ref 0–0.2)
BASOPHILS NFR BLD AUTO: 1 %
CHOLEST SERPL-MCNC: 245 MG/DL
EOSINOPHIL # BLD AUTO: 0.2 10E3/UL (ref 0–0.7)
EOSINOPHIL NFR BLD AUTO: 2 %
ERYTHROCYTE [DISTWIDTH] IN BLOOD BY AUTOMATED COUNT: 12.5 % (ref 10–15)
HCT VFR BLD AUTO: 42.8 % (ref 40–53)
HDLC SERPL-MCNC: 39 MG/DL
HGB BLD-MCNC: 14.6 G/DL (ref 13.3–17.7)
IMM GRANULOCYTES # BLD: 0.1 10E3/UL
IMM GRANULOCYTES NFR BLD: 1 %
LDLC SERPL CALC-MCNC: 141 MG/DL
LYMPHOCYTES # BLD AUTO: 2.8 10E3/UL (ref 0.8–5.3)
LYMPHOCYTES NFR BLD AUTO: 29 %
MCH RBC QN AUTO: 30.2 PG (ref 26.5–33)
MCHC RBC AUTO-ENTMCNC: 34.1 G/DL (ref 31.5–36.5)
MCV RBC AUTO: 89 FL (ref 78–100)
MONOCYTES # BLD AUTO: 0.7 10E3/UL (ref 0–1.3)
MONOCYTES NFR BLD AUTO: 7 %
NEUTROPHILS # BLD AUTO: 6 10E3/UL (ref 1.6–8.3)
NEUTROPHILS NFR BLD AUTO: 60 %
NONHDLC SERPL-MCNC: 206 MG/DL
NRBC # BLD AUTO: 0 10E3/UL
NRBC BLD AUTO-RTO: 0 /100
PLATELET # BLD AUTO: 204 10E3/UL (ref 150–450)
RBC # BLD AUTO: 4.83 10E6/UL (ref 4.4–5.9)
T4 FREE SERPL-MCNC: 0.47 NG/DL (ref 0.9–1.7)
TRIGL SERPL-MCNC: 323 MG/DL
TSH SERPL DL<=0.005 MIU/L-ACNC: 27.27 UIU/ML (ref 0.3–4.2)
WBC # BLD AUTO: 9.8 10E3/UL (ref 4–11)

## 2023-03-09 PROCEDURE — 84443 ASSAY THYROID STIM HORMONE: CPT | Mod: ZL | Performed by: PHYSICIAN ASSISTANT

## 2023-03-09 PROCEDURE — 85004 AUTOMATED DIFF WBC COUNT: CPT | Mod: ZL | Performed by: PHYSICIAN ASSISTANT

## 2023-03-09 PROCEDURE — 99204 OFFICE O/P NEW MOD 45 MIN: CPT | Performed by: PHYSICIAN ASSISTANT

## 2023-03-09 PROCEDURE — 86800 THYROGLOBULIN ANTIBODY: CPT | Mod: ZL | Performed by: PHYSICIAN ASSISTANT

## 2023-03-09 PROCEDURE — 36415 COLL VENOUS BLD VENIPUNCTURE: CPT | Mod: ZL | Performed by: PHYSICIAN ASSISTANT

## 2023-03-09 PROCEDURE — 84481 FREE ASSAY (FT-3): CPT | Mod: ZL | Performed by: PHYSICIAN ASSISTANT

## 2023-03-09 PROCEDURE — G0463 HOSPITAL OUTPT CLINIC VISIT: HCPCS

## 2023-03-09 PROCEDURE — 84439 ASSAY OF FREE THYROXINE: CPT | Mod: ZL | Performed by: PHYSICIAN ASSISTANT

## 2023-03-09 PROCEDURE — 80061 LIPID PANEL: CPT | Mod: ZL | Performed by: PHYSICIAN ASSISTANT

## 2023-03-09 RX ORDER — IBUPROFEN 800 MG/1
800 TABLET, FILM COATED ORAL EVERY 8 HOURS PRN
Qty: 60 TABLET | Refills: 3 | Status: SHIPPED | OUTPATIENT
Start: 2023-03-09 | End: 2023-04-19

## 2023-03-09 RX ORDER — LORAZEPAM 1 MG/1
1 TABLET ORAL EVERY 8 HOURS PRN
Qty: 30 TABLET | Refills: 0 | Status: SHIPPED | OUTPATIENT
Start: 2023-03-09 | End: 2023-03-16

## 2023-03-09 RX ORDER — METOPROLOL TARTRATE 50 MG
50 TABLET ORAL 2 TIMES DAILY
Qty: 60 TABLET | Refills: 1 | Status: SHIPPED | OUTPATIENT
Start: 2023-03-09 | End: 2023-04-19

## 2023-03-09 RX ORDER — DIAZEPAM 10 MG
10 TABLET ORAL EVERY 6 HOURS PRN
Qty: 1 TABLET | Refills: 0 | Status: SHIPPED | OUTPATIENT
Start: 2023-03-09 | End: 2023-03-16

## 2023-03-09 ASSESSMENT — ANXIETY QUESTIONNAIRES
GAD7 TOTAL SCORE: 4
GAD7 TOTAL SCORE: 4
6. BECOMING EASILY ANNOYED OR IRRITABLE: NOT AT ALL
3. WORRYING TOO MUCH ABOUT DIFFERENT THINGS: SEVERAL DAYS
1. FEELING NERVOUS, ANXIOUS, OR ON EDGE: SEVERAL DAYS
5. BEING SO RESTLESS THAT IT IS HARD TO SIT STILL: NOT AT ALL
7. FEELING AFRAID AS IF SOMETHING AWFUL MIGHT HAPPEN: NOT AT ALL
4. TROUBLE RELAXING: SEVERAL DAYS
2. NOT BEING ABLE TO STOP OR CONTROL WORRYING: SEVERAL DAYS
IF YOU CHECKED OFF ANY PROBLEMS ON THIS QUESTIONNAIRE, HOW DIFFICULT HAVE THESE PROBLEMS MADE IT FOR YOU TO DO YOUR WORK, TAKE CARE OF THINGS AT HOME, OR GET ALONG WITH OTHER PEOPLE: NOT DIFFICULT AT ALL

## 2023-03-09 ASSESSMENT — PATIENT HEALTH QUESTIONNAIRE - PHQ9: SUM OF ALL RESPONSES TO PHQ QUESTIONS 1-9: 0

## 2023-03-09 ASSESSMENT — PAIN SCALES - GENERAL: PAINLEVEL: NO PAIN (0)

## 2023-03-09 NOTE — PROGRESS NOTES
Assessment & Plan     Hyperthyroidism  He has had radiofrequency ablation in 2017  He has not sought treatment since that time. Never placed on synthroid as he moved to [place] and never saw a DrGurmeet There.  He looks Cushingoid.  Very anxious and blood pressure elevated.  He states severe thyroid tenderness.  We will start him on Nsaid to help with throat swelling.   - LORazepam (ATIVAN) 1 MG tablet; Take 1 tablet (1 mg) by mouth every 8 hours as needed for anxiety  - TSH with free T4 reflex; Future  - T3, Free; Future  - Anti thyroglobulin antibody; Future  - CBC with platelets and differential; Future  - NM Thyroid Uptake and Scan; Future  - metoprolol tartrate (LOPRESSOR) 50 MG tablet; Take 1 tablet (50 mg) by mouth 2 times daily  - Lipid Profile (Chol, Trig, HDL, LDL calc); Future    Review of external notes as documented elsewhere in note  Ordering of each unique test  Prescription drug management  10 minutes spent on the date of the encounter doing chart review, history and exam, documentation and further activities per the note       Nicotine/Tobacco Cessation:  He reports that he has been smoking cigarettes. He has a 7.50 pack-year smoking history. He has never used smokeless tobacco.  Nicotine/Tobacco Cessation Plan:   Self help information given to patient      See Patient Instructions    No follow-ups on file.    FRANCO Trevino  [facility] - SONJA Pitts is a 37 year old, presenting for the following health issues:  Throat Problem      HPI     Concern - Throat problem  Onset: 2 months  Description: feels like there is something putting pressure on neck at the base going to the chest  Intensity: mild  Progression of Symptoms:  same and constant  Accompanying Signs & Symptoms: voice is coming and going, sounds raspy. Will come and go.  Previous history of similar problem: yes, graves disease  Precipitating factors:        Worsened by: none   Alleviating factors:         Improved by: none  Therapies tried and outcome: loosening collar on shirts         Review of Systems   CONSTITUTIONAL: NEGATIVE for fever, chills, change in weight  INTEGUMENTARY/SKIN: NEGATIVE for worrisome rashes, moles or lesions  ENT/MOUTH: throat feels closed at times.  Able to breathe but has severe throat tightness and doesn't want anyone to touch his neck.  Has been going on a year    RESP feels very short of breathe when sleeping like air does not get in his throat    CV: NEGATIVE for chest pain, palpitations or peripheral edema  PSYCHIATRIC: anxiety is at its highest in his life as well as his weight is very high. He doesn't eat much as hard to swallow.       Objective    BP (!) 159/95 (BP Location: Left arm, Patient Position: Sitting, Cuff Size: Adult Large)   Pulse 108   Temp 98.4  F (36.9  C) (Tympanic)   Resp 16   Wt 132.5 kg (292 lb)   SpO2 96%   BMI 38.52 kg/m    Body mass index is 38.52 kg/m .  Physical Exam   GENERAL: healthy, alert and no distress  EYES: his eyes are very puffy.   HENT: rounded facies lips are swollen as well as his eyes.    NECK: no adenopathy, thyromegaly approximately 2 -3 times normal and   RESP: lungs clear to auscultation - no rales, rhonchi or wheezes  CV: regular rate and rhythm, normal S1 S2, no S3 or S4, no murmur, click or rub, no peripheral edema and peripheral pulses strong  ABDOMEN: soft, nontender, no hepatosplenomegaly, no masses and bowel sounds normal  MS: no gross musculoskeletal defects noted, no edema  SKIN: no suspicious lesions or rashes  PSYCH: mentation appears normal, tearful, anxious and fatigued  LYMPH: no cervical, supraclavicular, axillary, or inguinal adenopathy    Admission on 11/03/2019, Discharged on 11/03/2019   Component Date Value Ref Range Status     WBC 11/03/2019 9.7  4.0 - 11.0 10e9/L Final     RBC Count 11/03/2019 4.80  4.4 - 5.9 10e12/L Final     Hemoglobin 11/03/2019 14.4  13.3 - 17.7 g/dL Final     Hematocrit 11/03/2019 43.1  40.0  - 53.0 % Final     MCV 11/03/2019 90  78 - 100 fl Final     MCH 11/03/2019 30.0  26.5 - 33.0 pg Final     MCHC 11/03/2019 33.4  31.5 - 36.5 g/dL Final     RDW 11/03/2019 12.2  10.0 - 15.0 % Final     Platelet Count 11/03/2019 205  150 - 450 10e9/L Final     Diff Method 11/03/2019 Automated Method   Final     % Neutrophils 11/03/2019 59.7  % Final     % Lymphocytes 11/03/2019 31.3  % Final     % Monocytes 11/03/2019 6.2  % Final     % Eosinophils 11/03/2019 1.7  % Final     % Basophils 11/03/2019 0.7  % Final     % Immature Granulocytes 11/03/2019 0.4  % Final     Absolute Neutrophil 11/03/2019 5.8  1.6 - 8.3 10e9/L Final     Absolute Lymphocytes 11/03/2019 3.0  0.8 - 5.3 10e9/L Final     Absolute Monocytes 11/03/2019 0.6  0.0 - 1.3 10e9/L Final     Absolute Eosinophils 11/03/2019 0.2  0.0 - 0.7 10e9/L Final     Absolute Basophils 11/03/2019 0.1  0.0 - 0.2 10e9/L Final     Abs Immature Granulocytes 11/03/2019 0.0  0 - 0.4 10e9/L Final     INR 11/03/2019 1.00  0 - 1.3 Final     Sodium 11/03/2019 136  134 - 144 mmol/L Final     Potassium 11/03/2019 3.8  3.5 - 5.1 mmol/L Final     Chloride 11/03/2019 102  98 - 107 mmol/L Final     Carbon Dioxide 11/03/2019 26  21 - 31 mmol/L Final     Anion Gap 11/03/2019 8  3 - 14 mmol/L Final     Glucose 11/03/2019 155 (H)  70 - 105 mg/dL Final     Urea Nitrogen 11/03/2019 18  7 - 25 mg/dL Final     Creatinine 11/03/2019 1.00  0.70 - 1.30 mg/dL Final     GFR Estimate 11/03/2019 85  >60 mL/min/[1.73_m2] Final     GFR Estimate If Black 11/03/2019 >90  >60 mL/min/[1.73_m2] Final     Calcium 11/03/2019 9.6  8.6 - 10.3 mg/dL Final     Troponin 11/03/2019 2.9  <34.0 pg/mL Final

## 2023-03-10 LAB — T3FREE SERPL-MCNC: 2.2 PG/ML (ref 2–4.4)

## 2023-03-12 DIAGNOSIS — E89.0 POSTABLATIVE HYPOTHYROIDISM: Primary | ICD-10-CM

## 2023-03-12 RX ORDER — LEVOTHYROXINE SODIUM 25 UG/1
25 TABLET ORAL DAILY
Qty: 60 TABLET | Refills: 1 | Status: SHIPPED | OUTPATIENT
Start: 2023-03-12 | End: 2023-03-16

## 2023-03-13 LAB — THYROGLOB AB SERPL IA-ACNC: <20 IU/ML

## 2023-03-13 NOTE — RESULT ENCOUNTER NOTE
He has hypothyroidism. Notify him we are sending in a low dose of thyroid for him. Recheck in a month. I think we already made the appointment.

## 2023-03-13 NOTE — RESULT ENCOUNTER NOTE
Hx of graves ablation 6 years ago, never on any type of medications.  Very anxious and weak when came in looking myxedema.

## 2023-03-16 ENCOUNTER — OFFICE VISIT (OUTPATIENT)
Dept: FAMILY MEDICINE | Facility: OTHER | Age: 38
End: 2023-03-16
Attending: PHYSICIAN ASSISTANT
Payer: COMMERCIAL

## 2023-03-16 VITALS
DIASTOLIC BLOOD PRESSURE: 89 MMHG | HEART RATE: 84 BPM | TEMPERATURE: 97.9 F | BODY MASS INDEX: 39.57 KG/M2 | SYSTOLIC BLOOD PRESSURE: 137 MMHG | OXYGEN SATURATION: 96 % | HEIGHT: 73 IN | WEIGHT: 298.6 LBS

## 2023-03-16 DIAGNOSIS — E89.0 POSTABLATIVE HYPOTHYROIDISM: ICD-10-CM

## 2023-03-16 DIAGNOSIS — E89.0 POSTABLATIVE HYPOTHYROIDISM: Primary | ICD-10-CM

## 2023-03-16 PROCEDURE — G0463 HOSPITAL OUTPT CLINIC VISIT: HCPCS | Performed by: PHYSICIAN ASSISTANT

## 2023-03-16 PROCEDURE — 99213 OFFICE O/P EST LOW 20 MIN: CPT | Performed by: PHYSICIAN ASSISTANT

## 2023-03-16 RX ORDER — LEVOTHYROXINE SODIUM 25 UG/1
25 TABLET ORAL DAILY
Qty: 60 TABLET | Refills: 1 | Status: SHIPPED | OUTPATIENT
Start: 2023-03-16 | End: 2023-04-19 | Stop reason: ALTCHOICE

## 2023-03-16 RX ORDER — LORAZEPAM 1 MG/1
1 TABLET ORAL EVERY 8 HOURS PRN
Qty: 30 TABLET | Refills: 0 | Status: SHIPPED | OUTPATIENT
Start: 2023-03-16 | End: 2023-06-01 | Stop reason: ALTCHOICE

## 2023-03-16 ASSESSMENT — PAIN SCALES - GENERAL: PAINLEVEL: NO PAIN (0)

## 2023-03-16 NOTE — PROGRESS NOTES
"  Assessment & Plan     Postablative hypothyroidism  Better today, his pressure is good. His is more relaxed on Ativan.  We will keep this going until his thyroid settles in.  Back in 3 weeks to check dose .    - LORazepam (ATIVAN) 1 MG tablet; Take 1 tablet (1 mg) by mouth every 8 hours as needed for anxiety    Review of external notes as documented elsewhere in note  Ordering of each unique test  Prescription drug management  10  minutes spent on the date of the encounter doing chart review, history and exam, documentation and further activities per the note       BMI:   Estimated body mass index is 39.4 kg/m  as calculated from the following:    Height as of this encounter: 1.854 m (6' 1\").    Weight as of this encounter: 135.4 kg (298 lb 9.6 oz).   Weight management plan: Discussed healthy diet and exercise guidelines    See Patient Instructions    No follow-ups on file.    FRANCO Trevino  [facility] - SONJA Pitts is a 37 year old accompanied by his self , presenting for the following health issues:  Follow Up      HPI   Would like ears flushed out  We will be happy to do this today.     Hypothyroidism Follow-up      Since last visit, patient describes the following symptoms: anxiety and fatigue              Review of Systems   Constitutional, HEENT, cardiovascular, pulmonary, gi and gu systems are negative, except as otherwise noted.      Objective    /89 (BP Location: Left arm, Patient Position: Sitting, Cuff Size: Adult Regular)   Pulse 84   Temp 97.9  F (36.6  C) (Tympanic)   Ht 1.854 m (6' 1\")   Wt 135.4 kg (298 lb 9.6 oz)   SpO2 96%   BMI 39.40 kg/m    Body mass index is 39.4 kg/m .  Physical Exam   GENERAL: healthy, alert and no distress  EYES: Eyes grossly normal to inspection, PERRL and conjunctivae and sclerae normal  HENT: ear canals and TM's normal, nose and mouth without ulcers or lesions  HENT: ear is flushed.   NECK: no adenopathy, no asymmetry, " masses, or scars and thyroid normal to palpation  RESP: lungs clear to auscultation - no rales, rhonchi or wheezes  CV: regular rate and rhythm, normal S1 S2, no S3 or S4, no murmur, click or rub, no peripheral edema and peripheral pulses strong  ABDOMEN: soft, nontender, no hepatosplenomegaly, no masses and bowel sounds normal  MS: no gross musculoskeletal defects noted, no edema

## 2023-03-27 ENCOUNTER — HOSPITAL ENCOUNTER (EMERGENCY)
Facility: OTHER | Age: 38
Discharge: HOME | End: 2023-03-27
Admitting: PHYSICIAN ASSISTANT
Payer: COMMERCIAL

## 2023-03-27 ENCOUNTER — TELEPHONE (OUTPATIENT)
Dept: FAMILY MEDICINE | Facility: OTHER | Age: 38
End: 2023-03-27

## 2023-03-27 ENCOUNTER — APPOINTMENT (OUTPATIENT)
Dept: CT IMAGING | Facility: OTHER | Age: 38
End: 2023-03-27
Attending: PHYSICIAN ASSISTANT
Payer: COMMERCIAL

## 2023-03-27 VITALS
WEIGHT: 298 LBS | TEMPERATURE: 98.3 F | DIASTOLIC BLOOD PRESSURE: 91 MMHG | RESPIRATION RATE: 20 BRPM | HEART RATE: 80 BPM | OXYGEN SATURATION: 96 % | BODY MASS INDEX: 39.32 KG/M2 | SYSTOLIC BLOOD PRESSURE: 153 MMHG

## 2023-03-27 DIAGNOSIS — E05.00 GRAVES DISEASE: ICD-10-CM

## 2023-03-27 LAB
ANION GAP SERPL CALCULATED.3IONS-SCNC: 11 MMOL/L (ref 7–15)
BUN SERPL-MCNC: 18.2 MG/DL (ref 6–20)
CALCIUM SERPL-MCNC: 9.3 MG/DL (ref 8.6–10)
CHLORIDE SERPL-SCNC: 100 MMOL/L (ref 98–107)
CREAT SERPL-MCNC: 0.95 MG/DL (ref 0.67–1.17)
DEPRECATED HCO3 PLAS-SCNC: 24 MMOL/L (ref 22–29)
GFR SERPL CREATININE-BSD FRML MDRD: >90 ML/MIN/1.73M2
GLUCOSE SERPL-MCNC: 104 MG/DL (ref 70–99)
POTASSIUM SERPL-SCNC: 4 MMOL/L (ref 3.4–5.3)
SODIUM SERPL-SCNC: 135 MMOL/L (ref 136–145)
T4 FREE SERPL-MCNC: 0.47 NG/DL (ref 0.9–1.7)
TROPONIN T SERPL HS-MCNC: 11 NG/L
TSH SERPL DL<=0.005 MIU/L-ACNC: 38.99 UIU/ML (ref 0.3–4.2)

## 2023-03-27 PROCEDURE — 250N000011 HC RX IP 250 OP 636: Performed by: PHYSICIAN ASSISTANT

## 2023-03-27 PROCEDURE — 99284 EMERGENCY DEPT VISIT MOD MDM: CPT | Performed by: PHYSICIAN ASSISTANT

## 2023-03-27 PROCEDURE — 93005 ELECTROCARDIOGRAM TRACING: CPT | Performed by: PHYSICIAN ASSISTANT

## 2023-03-27 PROCEDURE — 99285 EMERGENCY DEPT VISIT HI MDM: CPT | Mod: 25 | Performed by: PHYSICIAN ASSISTANT

## 2023-03-27 PROCEDURE — 70491 CT SOFT TISSUE NECK W/DYE: CPT

## 2023-03-27 PROCEDURE — 84439 ASSAY OF FREE THYROXINE: CPT | Performed by: PHYSICIAN ASSISTANT

## 2023-03-27 PROCEDURE — 80048 BASIC METABOLIC PNL TOTAL CA: CPT | Performed by: PHYSICIAN ASSISTANT

## 2023-03-27 PROCEDURE — 36415 COLL VENOUS BLD VENIPUNCTURE: CPT | Performed by: PHYSICIAN ASSISTANT

## 2023-03-27 PROCEDURE — 84484 ASSAY OF TROPONIN QUANT: CPT | Performed by: PHYSICIAN ASSISTANT

## 2023-03-27 PROCEDURE — 93010 ELECTROCARDIOGRAM REPORT: CPT | Performed by: INTERNAL MEDICINE

## 2023-03-27 PROCEDURE — 84443 ASSAY THYROID STIM HORMONE: CPT | Performed by: PHYSICIAN ASSISTANT

## 2023-03-27 RX ORDER — IOPAMIDOL 755 MG/ML
100 INJECTION, SOLUTION INTRAVASCULAR ONCE
Status: COMPLETED | OUTPATIENT
Start: 2023-03-27 | End: 2023-03-27

## 2023-03-27 RX ADMIN — IOPAMIDOL 100 ML: 755 INJECTION, SOLUTION INTRAVENOUS at 17:02

## 2023-03-27 ASSESSMENT — ACTIVITIES OF DAILY LIVING (ADL)
ADLS_ACUITY_SCORE: 35

## 2023-03-27 NOTE — TELEPHONE ENCOUNTER
Patient called stating he has been experiencing throat tightening since starting medication levothyroxine about one week ago. Patient reports dizziness starting today and continuing currently.   Writer advised patient to be seen at nearest ED. Patient verbalized understanding.

## 2023-03-27 NOTE — ED PROVIDER NOTES
History     Chief Complaint   Patient presents with     Dizziness     HPI  Hong Murillo is a 37 year old male who arrives to the emergency department with a feeling of dizziness palpitation and hoarseness in his voice.  Patient has a past medical history of leukocytosis, hypokalemia, migraine without aura, Graves' disease generalized anxiety disorder.  Last week patient was recently started on 25 mcg of Synthroid.  He was told that if he starts having any symptoms he should return to an ER.  Today he presents with a feeling of dizziness, tremulous, palpitations, anxiety as well and it as a feeling of fullness in his throat and hoarseness.  Roughly 7-8 years ago he did go through iodine-131 treatment for his Graves' disease and has had a recent ultrasound.  Patient endorses some sweats no chills mild headache for the past few days no visual changes no new ringing in the ears no difficulty swallowing but a feeling of fullness in his throat feeling of palpitations no chest pain some shortness of breath which he attributes to anxiety no cough no abdominal pain no nausea no vomiting he states he has had constipation recently he has had some stools but they have been hard.    Allergies:  No Known Allergies    Problem List:    Patient Active Problem List    Diagnosis Date Noted     Graves disease 03/06/2017     Priority: Medium     NORM (generalized anxiety disorder) 03/06/2017     Priority: Medium     ACP (advance care planning) 12/15/2016     Priority: Medium     Advance Care Planning 12/15/2016: ACP Review of Chart / Resources Provided:  Reviewed chart for advance care plan.  Hong Murillo has no plan or code status on file. Discussed available resources and provided with information. Confirmed code status reflects current choices pending further ACP discussions.  Confirmed/documented legally designated decision makers.  Added by Zahraa Sanchez             Leukocytosis 06/13/2015     Priority: Medium      Hypokalemia 06/13/2015     Priority: Medium     Migraine without aura and with status migrainosus, not intractable 06/13/2015     Priority: Medium     Ilio-inguinal strain 06/18/2013     Priority: Medium        Past Medical History:    No past medical history on file.    Past Surgical History:    No past surgical history on file.    Family History:    No family history on file.    Social History:  Marital Status:   [4]  Social History     Tobacco Use     Smoking status: Every Day     Packs/day: 0.50     Years: 15.00     Pack years: 7.50     Types: Cigarettes     Smokeless tobacco: Never     Tobacco comments:     Ready to quit again   Vaping Use     Vaping Use: Never used   Substance Use Topics     Alcohol use: No     Comment: rarely     Drug use: Yes     Comment: marijuana- last time used yesterday        Medications:    ibuprofen (ADVIL/MOTRIN) 800 MG tablet  levothyroxine (SYNTHROID/LEVOTHROID) 25 MCG tablet  LORazepam (ATIVAN) 1 MG tablet  metoprolol tartrate (LOPRESSOR) 50 MG tablet          Review of Systems ROS is listed in HPI    Physical Exam   BP: (!) 153/91  Pulse: 80  Temp: 98.3  F (36.8  C)  Resp: 20  Weight: 135.2 kg (298 lb)  SpO2: 96 %      Physical Exam  Vitals: Afebrile, pulse of 80, blood pressure 153/91 is 96% on room air  General: alert, oriented to person, place, time  Head: atraumatic  Eyes: PERRL, corneas clear and conjunctivae clear  Neck: no tenderness, supple and no adenopathy no significant thyroid enlargement  Chest/Pulmonary: chest clear with equal lung sounds bilaterally, no chest wall tenderness or deformities, no tachypnea and no cyanosis  Cardiovascular: S1, S2 normal, regular rate and rhythm, no murmur and no pedal edema  Abdomen: soft, non-tender, no guarding, no rebound tenderness  Musculoskeletal/Extremities: normal extremities, no edema, erythema, tenderness and 5/5 strength in all 4 extremities  Skin: no diaphoresis and skin color normal  Neuro: speech clear and gait  stable, cranial nerves II through XII are grossly intact good upper extremity strength good lower extremity strength patient plantarflex and dorsiflex against resistance.  Psychiatric: affect/mood normal, cooperative, normal judgement/insight and memory intact  ED Course              ED Course as of 03/27/23 1924   Mon Mar 27, 2023   1646 Troponin T, High Sensitivity  Troponin is reassuring   1648 EKG 12-lead, tracing only  EKG shows a normal sinus rhythm, no ectopy, no ST elevation no ST depression.   1747 CT Soft Tissue Neck w Contrast  IMPRESSION:   Mild pharyngeal mucosal thickening suggesting pharyngitis. No evidence  of abscess.     Normal appearance of the thyroid gland.     No evidence of pathologic lymph node enlargement.   1855 Lab was called over 30 minutes ago and asked why the TSH and BMP were not running they stated at this time that they were going to run it.  Subsequently less than 5 minutes ago another nurse called and stated that these were not resulted and then the lab informed them that they just found them and were running them now.  Patient would like to leave is his wife is waiting here I will call him with the final results.  He states he will return if he does any treatment according to these results.   1911 Basic metabolic panel(!)  Slightly decrease in sodium.  Glucose is 104 patient is nonfasting   1921 TSH Reflex GH(!)  TSH 38.992 weeks ago was 27.  I will call and inform the patient he should follow-up in clinic.   1924 I called and informed the patient he will continue with his Synthroid he will follow-up with clinic.     Procedures                Results for orders placed or performed during the hospital encounter of 03/27/23 (from the past 24 hour(s))   Basic metabolic panel   Result Value Ref Range    Sodium 135 (L) 136 - 145 mmol/L    Potassium 4.0 3.4 - 5.3 mmol/L    Chloride 100 98 - 107 mmol/L    Carbon Dioxide (CO2) 24 22 - 29 mmol/L    Anion Gap 11 7 - 15 mmol/L    Urea  Nitrogen 18.2 6.0 - 20.0 mg/dL    Creatinine 0.95 0.67 - 1.17 mg/dL    Calcium 9.3 8.6 - 10.0 mg/dL    Glucose 104 (H) 70 - 99 mg/dL    GFR Estimate >90 >60 mL/min/1.73m2   Troponin T, High Sensitivity   Result Value Ref Range    Troponin T, High Sensitivity 11 <=22 ng/L   TSH Reflex GH   Result Value Ref Range    TSH 38.99 (H) 0.30 - 4.20 uIU/mL   CT Soft Tissue Neck w Contrast    Narrative    CT SOFT TISSUE NECK W CONTRAST, 3/27/2023 5:06 PM    History: Male, age 37 years; History of Graves' disease iodine  radiation now changing TSH fullness in throat and hoarseness    Comparison: No relevant prior imaging.    Technique: CT scan was performed of the neck  after the administration  of intravenous contrast. Axial; sagittal and coronal MIP images were  reviewed.    FINDINGS:   Visualized portions of the brain are unremarkable.    Muscles of mastication and the salivary glands are unremarkable.    The oral and pharyngeal mucosa is mildly thickened. No evidence of  apparent abscess.    Thyroid gland and the larynx are unremarkable.    Lymph nodes throughout the neck are normal in size and number.    Vascular structures demonstrate no acute abnormality.  Bony structures are also unremarkable. Visualized portions of the  lungs demonstrate mild air trapping      Impression    IMPRESSION:   Mild pharyngeal mucosal thickening suggesting pharyngitis. No evidence  of abscess.    Normal appearance of the thyroid gland.    No evidence of pathologic lymph node enlargement.    This facility minimizes radiation dose by adjusting the mA and/or kV  according to each patient size.      This CT scan was performed using one or more the following dose  reduction techniques:    -Automated exposure control,  -Adjustment of the mA and/or kV according to patient's size, and/or,  -Use of iterative reconstruction technique.    WILLEM MITTAL MD         SYSTEM ID:  [id number]       Medications   iopamidol (ISOVUE-370) solution 100 mL (100 mLs  Intravenous $Given 3/27/23 1702)       Assessments & Plan (with Medical Decision Making)     I have reviewed the nursing notes.    I have reviewed the findings, diagnosis, plan and need for follow up with the patient.          Medical Decision Making    Differential diagnosis includes but is not limited to hyperthyroidism, thyroid storm, MyDexAcoma, electrolyte abnormality, arrhythmia, soft tissue neck mass, and anxiety.    At this time will obtain an EKG, BMP, TSH as well as troponin we will obtain a CT of neck with contrast.        New Prescriptions    No medications on file       Final diagnoses:   Graves disease       3/27/2023   [facility] AND [facility]     Jin Tuttle PA-C  03/27/23 1856       Jin Tuttle PA-C  03/27/23 1911       Jin Tuttle PA-C  03/27/23 1924

## 2023-03-27 NOTE — DISCHARGE INSTRUCTIONS
You are seen in the emergency department today for concerns of your thyroid.  The CT of your neck showed no signs of any acute abnormalities or masses.  Your cardiac enzyme was negative which is reassuring.  I will call you with remaining blood work.  If at any point you start having any severe pain or difficulty swallowing secretions please return to an ER.

## 2023-03-27 NOTE — ED TRIAGE NOTES
"Pt comes into the ER today reporting throat tightness, anxiety, dizziness and light headedness. Symptoms for about a week, worse today. Saw Steffi Loomis 11 days ago prescribed levothyroxine for Graves Disease, was told to watch for symptoms of thyroid storm. He is concerned he is in that today. He reports that his voice has been different \"off and on\" due to the thyroid being inflamed and pushing on his vocal cords.      Triage Assessment     Row Name 03/27/23 1525       Triage Assessment (Adult)    Airway WDL WDL       Respiratory WDL    Respiratory WDL WDL       Skin Circulation/Temperature WDL    Skin Circulation/Temperature WDL WDL       Cardiac WDL    Cardiac WDL WDL       Peripheral/Neurovascular WDL    Peripheral Neurovascular WDL WDL       Cognitive/Neuro/Behavioral WDL    Cognitive/Neuro/Behavioral WDL WDL       Jaja Coma Scale    Best Eye Response 4-->(E4) spontaneous    Best Motor Response 6-->(M6) obeys commands    Best Verbal Response 5-->(V5) oriented    Jaja Coma Scale Score 15              "

## 2023-03-31 ENCOUNTER — TELEPHONE (OUTPATIENT)
Dept: NUCLEAR MEDICINE | Facility: HOSPITAL | Age: 38
End: 2023-03-31

## 2023-03-31 ENCOUNTER — TELEPHONE (OUTPATIENT)
Dept: FAMILY MEDICINE | Facility: OTHER | Age: 38
End: 2023-03-31

## 2023-03-31 NOTE — TELEPHONE ENCOUNTER
This patient will be rescheduled for his Nuclear Medicine I-123 Thyroid Uptake and Scan.    Withhold levothyroxine 4 weeks  No CT contrast within 30 days.    Patient aware.  Also discussed NPO 2 hours prior pill and 2 hours after pill.    Iliana

## 2023-03-31 NOTE — TELEPHONE ENCOUNTER
Tl Loomis,     I am prepping the NM schedule for Monday.  This patient is scheduled for a Nuclear Medicine I-123 Thyroid Uptake and Scan.  There is preparation for this exam and this patient is taking a medication that needs to be withheld 4 weeks prior to the appointment.   He last took his levothyroxine yesterday, 3-30-23.        It is also advised to not have iodine contrast 1 month prior (he recently had a CT exam in the ER with contrast).    I am scheduled to 11:00 today and wanted to try to touch base with you before we rescheduled Hong's appointment.    If I don't here from you very shortly, I will have scheduling call him to reschedule.    Thank you,  Day    - I also left a voicemail on your office phone.

## 2023-03-31 NOTE — TELEPHONE ENCOUNTER
Called patient to see when his last dose of levothryroxine was.  Patient stated it is a new medication to him and he just started this and his last dose was yesterday.    I will need to contact the ordering provider as prep for this exam is to hold this medication 4 weeks prior to the exam.    Patient is aware and knows I will be calling him back.

## 2023-04-03 NOTE — TELEPHONE ENCOUNTER
Notified patient that there would be no scans for the next 6 months plus and to continue to take medications for the time being per Desiree Loomis. Patient stated understanding and that they are currently still taking the medication and knows they have an appointment coming up with Desiree.

## 2023-04-19 ENCOUNTER — OFFICE VISIT (OUTPATIENT)
Dept: FAMILY MEDICINE | Facility: OTHER | Age: 38
End: 2023-04-19
Attending: PHYSICIAN ASSISTANT
Payer: COMMERCIAL

## 2023-04-19 VITALS
HEIGHT: 73 IN | HEART RATE: 125 BPM | DIASTOLIC BLOOD PRESSURE: 98 MMHG | SYSTOLIC BLOOD PRESSURE: 170 MMHG | RESPIRATION RATE: 18 BRPM | TEMPERATURE: 98.1 F | BODY MASS INDEX: 40.02 KG/M2 | WEIGHT: 302 LBS | OXYGEN SATURATION: 95 %

## 2023-04-19 DIAGNOSIS — R49.1 LOSS OF VOICE: ICD-10-CM

## 2023-04-19 DIAGNOSIS — G47.33 OBSTRUCTIVE SLEEP APNEA SYNDROME: ICD-10-CM

## 2023-04-19 DIAGNOSIS — R13.13 PHARYNGEAL DYSPHAGIA: ICD-10-CM

## 2023-04-19 DIAGNOSIS — E06.0 THYROIDITIS, ACUTE: ICD-10-CM

## 2023-04-19 DIAGNOSIS — E03.9 ACQUIRED HYPOTHYROIDISM: Primary | ICD-10-CM

## 2023-04-19 DIAGNOSIS — I10 BENIGN ESSENTIAL HYPERTENSION: ICD-10-CM

## 2023-04-19 DIAGNOSIS — F41.9 ANXIETY: ICD-10-CM

## 2023-04-19 LAB
T4 FREE SERPL-MCNC: 0.59 NG/DL (ref 0.9–1.7)
TSH SERPL DL<=0.005 MIU/L-ACNC: 28.49 UIU/ML (ref 0.3–4.2)

## 2023-04-19 PROCEDURE — 84439 ASSAY OF FREE THYROXINE: CPT | Mod: ZL | Performed by: PHYSICIAN ASSISTANT

## 2023-04-19 PROCEDURE — 99214 OFFICE O/P EST MOD 30 MIN: CPT | Performed by: PHYSICIAN ASSISTANT

## 2023-04-19 PROCEDURE — G0463 HOSPITAL OUTPT CLINIC VISIT: HCPCS

## 2023-04-19 PROCEDURE — 36415 COLL VENOUS BLD VENIPUNCTURE: CPT | Mod: ZL | Performed by: PHYSICIAN ASSISTANT

## 2023-04-19 PROCEDURE — 84443 ASSAY THYROID STIM HORMONE: CPT | Mod: ZL | Performed by: PHYSICIAN ASSISTANT

## 2023-04-19 RX ORDER — IBUPROFEN 800 MG/1
800 TABLET, FILM COATED ORAL EVERY 8 HOURS PRN
Qty: 60 TABLET | Refills: 3 | Status: SHIPPED | OUTPATIENT
Start: 2023-04-19 | End: 2023-08-17

## 2023-04-19 RX ORDER — ESCITALOPRAM OXALATE 10 MG/1
10 TABLET ORAL DAILY
Qty: 30 TABLET | Refills: 3 | Status: SHIPPED | OUTPATIENT
Start: 2023-04-19 | End: 2023-08-17

## 2023-04-19 RX ORDER — METOPROLOL TARTRATE 50 MG
50 TABLET ORAL 3 TIMES DAILY
Qty: 90 TABLET | Refills: 1 | Status: SHIPPED | OUTPATIENT
Start: 2023-04-19 | End: 2023-06-01

## 2023-04-19 RX ORDER — METOPROLOL TARTRATE 50 MG
50 TABLET ORAL 2 TIMES DAILY
Qty: 60 TABLET | Refills: 1 | Status: SHIPPED | OUTPATIENT
Start: 2023-04-19 | End: 2023-04-19

## 2023-04-19 RX ORDER — LEVOTHYROXINE SODIUM 75 UG/1
75 TABLET ORAL DAILY
Qty: 30 TABLET | Refills: 1 | Status: SHIPPED | OUTPATIENT
Start: 2023-04-19 | End: 2023-06-05

## 2023-04-19 RX ORDER — CLONAZEPAM 1 MG/1
1 TABLET ORAL 2 TIMES DAILY PRN
Qty: 60 TABLET | Refills: 1 | Status: SHIPPED | OUTPATIENT
Start: 2023-04-19 | End: 2023-06-01

## 2023-04-19 ASSESSMENT — PAIN SCALES - GENERAL: PAINLEVEL: NO PAIN (0)

## 2023-04-19 NOTE — PROGRESS NOTES
"  Assessment & Plan     Hypothyroidism  He will bump to tid on lopressor.  His heart rate is rapid but no irregularity.  He is very emotional today.  Sad and blue.  Things are really bothersome at home. Kids are stressful.  He is upset all the time.    - metoprolol tartrate (LOPRESSOR) 50 MG tablet; Take 1 tablet (50 mg) by mouth 3 times daily  - levothyroxine (SYNTHROID/LEVOTHROID) 75 MCG tablet; Take 1 tablet (75 mcg) by mouth daily    Anxiety  Add this in and also we will give him Klonopin.  1 mg bid daily until we get him settled back in.   - escitalopram (LEXAPRO) 10 MG tablet; Take 1 tablet (10 mg) by mouth daily    Review of external notes as documented elsewhere in note  Ordering of each unique test  Prescription drug management  10  minutes spent by me on the date of the encounter doing chart review, history and exam, documentation and further activities per the note       See Patient Instructions    No follow-ups on file.    FRANCO Trevino  [facility] - SONJA Pitts is a 37 year old, presenting for the following health issues:  RECHECK         View : No data to display.              HPI     Patient also having ear pain in left ear.     Hypothyroidism Follow-up      Since last visit, patient describes the following symptoms: weight gain of 5 lbs, anxiety and fatigue. Also having muscle cramps in left side.           Review of Systems   Constitutional, HEENT, cardiovascular, pulmonary, GI, , musculoskeletal, neuro, skin, endocrine and psych systems are negative, except as otherwise noted.      Objective    BP (!) 148/91 (BP Location: Right arm, Patient Position: Sitting, Cuff Size: Adult Large)   Pulse (!) 125   Temp 98.1  F (36.7  C) (Tympanic)   Resp 18   Ht 1.854 m (6' 1\")   Wt 137 kg (302 lb)   SpO2 95%   BMI 39.84 kg/m    Body mass index is 39.84 kg/m .  Physical Exam   GENERAL: healthy, alert and no distress  EYES: Eyes grossly normal to inspection, PERRL " and conjunctivae and sclerae normal  HENT: ear canals and TM's normal, nose and mouth without ulcers or lesions  NECK: no adenopathy, no asymmetry, masses, or scars and thyroid normal to palpation but very tedner still. He doesn't have the massive pressure but still has tenderness on touching the area.   RESP: lungs clear to auscultation - no rales, rhonchi or wheezes  CV: regular rate and rhythm, normal S1 S2, no S3 or S4, no murmur, click or rub, no peripheral edema and peripheral pulses strong  ABDOMEN: soft, nontender, no hepatosplenomegaly, no masses and bowel sounds normal  MS: no gross musculoskeletal defects noted, no edema    Admission on 03/27/2023, Discharged on 03/27/2023   Component Date Value Ref Range Status     Sodium 03/27/2023 135 (L)  136 - 145 mmol/L Final     Potassium 03/27/2023 4.0  3.4 - 5.3 mmol/L Final     Chloride 03/27/2023 100  98 - 107 mmol/L Final     Carbon Dioxide (CO2) 03/27/2023 24  22 - 29 mmol/L Final     Anion Gap 03/27/2023 11  7 - 15 mmol/L Final     Urea Nitrogen 03/27/2023 18.2  6.0 - 20.0 mg/dL Final     Creatinine 03/27/2023 0.95  0.67 - 1.17 mg/dL Final     Calcium 03/27/2023 9.3  8.6 - 10.0 mg/dL Final     Glucose 03/27/2023 104 (H)  70 - 99 mg/dL Final     GFR Estimate 03/27/2023 >90  >60 mL/min/1.73m2 Final    eGFR calculated using 2021 CKD-EPI equation.     Troponin T, High Sensitivity 03/27/2023 11  <=22 ng/L Final    Either a High Sensitivity Troponin T baseline (0 hours) value = 100 ng/L, or an increase in High Sensitivity Troponin T = 7 ng/L at 2 hours compared to 0 hours (2-0 hours), suggests myocardial injury, and urgent clinical attention is required.    If the 2-0 hours increase is <7 ng/L, a High Sensitivity Troponin T result above gender-specific reference ranges warrants further evaluation.   Recommendations for further evaluation include correlation with clinical decision-making tool (e.g., HEART), a 3rd High Sensitivity Troponin T test 2 hours after the  2nd (a 20% change from baseline would represent concern), admission for observation, close PCC/cardiology follow-up, or urgent outpatient provocative testing.     TSH 03/27/2023 38.99 (H)  0.30 - 4.20 uIU/mL Final     Free T4 03/27/2023 0.47 (L)  0.90 - 1.70 ng/dL Final

## 2023-04-20 NOTE — RESULT ENCOUNTER NOTE
Chief complaint:   Chief Complaint   Patient presents with   • Sinus Problem     symptoms x 1am, 20 weeks pregnant        Vitals:  Visit Vitals  /72 (BP Location: RUE - Right upper extremity, Patient Position: Sitting, Cuff Size: Large Adult)   Pulse (!) 106   Temp 97.5 °F (36.4 °C) (Tympanic)   Resp 18   Ht 5' 6\" (1.676 m)   Wt 117 kg   SpO2 98%   Breastfeeding No   BMI 41.64 kg/m²       HISTORY OF PRESENT ILLNESS     The patient is a 32 year old female at 20 week gestation that presents to urgent care with nasal congestion since early this morning.  She has started to feel occasional fetal movements, and these are normal today.  She has used an albuterol inhaler for during respiratory illnesses in the past, but she has not had any problems in several years.  Good intake of fluids.    ADULT COVID-19 SPECIFIC ROS:  Fever: No  Chills: No    Cough (new onset or worsening of chronic): No  Chest Pain: No  Shortness of breath: No    Headache: Yes, chronic  Myalgia: Yes, backache  Fatigue: No    Sore throat: No  Runny or stuffy nose: Yes  Loss of smell or taste: No  Rash: No    Nausea: Yes  Abdominal pain: No  Vomiting: No  Diarrhea: Yes, watery, no blood    -----  Exposure to sick contacts? Yes, two children with URI symptoms  Any contact with a confirmed-positive COVID case? No  (If YES: include date/details of last contact with that case)    Social History:  Employment, in-person school, /? Homemaker    Relevant Medical/Surgical History:  Pregnancy, gestational diabetes          Sinus Problem  This is a new problem. The current episode started today. The problem has been unchanged. Nothing aggravates the symptoms. She has tried nothing for the symptoms.       Other significant problems:  Patient Active Problem List    Diagnosis Date Noted   • EP (ectopic pregnancy) 09/17/2015     Priority: Low   • Planned pregnancy 09/05/2015     Priority: Low   • Low serum progesterone 09/05/2015     Priority:  Trending down. Better.  Seeing back in a month on higher dose.  Low   • Pregnancy with history of multiple pregnancy loss 2015     Priority: Low   • S/P knee surgery 2013     Priority: Low   • Overweight(278.02) 2013     Priority: Low   • Anxiety      Priority: Low       PAST MEDICAL, FAMILY AND SOCIAL HISTORY     Medications:  Current Outpatient Medications   Medication   • Insulin Lispro (HUMALOG SC)   • Insulin NPH Human, Isophane, (HUMULIN N SC)   • hydroxyPROGESTERONE caproate (CAROLINE) 250 MG/ML Oil   • Prenatal Vit-DSS-Fe Fum-FA (SE- 19) 29-1 MG Tab   • ciprofloxacin (CIPRO) 0.3 % ophthalmic solution   • progesterone 100 MG vaginal suppository   • adapalene (DIFFERIN) 0.1 % cream   • clobetasol (TEMOVATE) 0.05 % topical solution   • lidocaine viscous (XYLOCAINE) 2 % solution   • aluminum-magnesium hydroxide-simethicone (MAALOX REGULAR STRENGTH) 200-200-20 MG/5ML Suspension   • Valacyclovir HCl 1000 MG Tab   • Progesterone 200 MG Suppos   • albuterol (PROAIR HFA) 108 (90 BASE) MCG/ACT inhaler   • Spacer/Aero-Holding Chambers (AEROCHAMBER)   • benzoyl peroxide 5 % gel     No current facility-administered medications for this visit.        Allergies:  ALLERGIES:  No Known Allergies    Past Medical  History/Surgeries:  Past Medical History:   Diagnosis Date   • Anxiety    • History of chicken pox    • PONV (postoperative nausea and vomiting)    • Spontaneous vaginal delivery        Past Surgical History:   Procedure Laterality Date   • Gynecologic cryosurgery     • Knee surgery Right    • Ovarian cyst removal         Family History:  Family History   Problem Relation Age of Onset   • High cholesterol Father    • Hyperlipidemia Father         Hyperlipidemia   • Hypertension Father    • Thyroid Mother    • Bipolar disorder Paternal Uncle    • * Paternal Aunt         Cyst   • Diabetes Maternal Grandfather    • Myocardial Infarction Maternal Grandfather    • Heart disease Maternal Grandfather    • Diabetes Paternal Grandfather    • Heart disease Paternal  Aunt         CAD   • High cholesterol Paternal Aunt    • Obesity Maternal Aunt    • Obesity Other         Cousin   • Stroke Paternal Uncle        Social History:  Social History     Tobacco Use   • Smoking status: Never Smoker   • Smokeless tobacco: Never Used   Substance Use Topics   • Alcohol use: No       REVIEW OF SYSTEMS     Review of Systems  Per HPI  PHYSICAL EXAM     Physical Exam  Vitals signs and nursing note reviewed.   Constitutional:       General: She is not in acute distress.     Appearance: She is well-developed and overweight. She is not ill-appearing or diaphoretic.   HENT:      Right Ear: Hearing, ear canal and external ear normal. Tympanic membrane is not erythematous or bulging.      Left Ear: Hearing, ear canal and external ear normal. Tympanic membrane is not erythematous or bulging.      Nose: Congestion present. No rhinorrhea.      Right Sinus: No maxillary sinus tenderness or frontal sinus tenderness.      Left Sinus: No maxillary sinus tenderness or frontal sinus tenderness.      Mouth/Throat:      Mouth: Mucous membranes are moist. Mucous membranes are not dry. No oral lesions.      Pharynx: Oropharynx is clear. Uvula midline. Posterior oropharyngeal erythema (mild) present. No oropharyngeal exudate.   Eyes:      General: Lids are normal.      Conjunctiva/sclera: Conjunctivae normal.      Pupils: Pupils are equal, round, and reactive to light.   Neck:      Musculoskeletal: Normal range of motion and neck supple.   Cardiovascular:      Rate and Rhythm: Normal rate and regular rhythm.      Heart sounds: Normal heart sounds.   Pulmonary:      Effort: Pulmonary effort is normal. No accessory muscle usage or respiratory distress.      Breath sounds: Normal breath sounds. No decreased breath sounds, wheezing, rhonchi or rales.   Musculoskeletal: Normal range of motion.         General: No tenderness.   Lymphadenopathy:      Head:      Right side of head: No tonsillar or occipital adenopathy.       Left side of head: No tonsillar or occipital adenopathy.      Cervical:      Right cervical: No posterior cervical adenopathy.     Left cervical: No posterior cervical adenopathy.      Upper Body:      Right upper body: No supraclavicular adenopathy.      Left upper body: No supraclavicular adenopathy.   Skin:     General: Skin is warm and dry.      Coloration: Skin is not pale.      Findings: No rash.   Neurological:      Mental Status: She is alert and oriented to person, place, and time.   Psychiatric:         Behavior: Behavior normal. Behavior is cooperative.         ASSESSMENT/PLAN     Diagnoses and all orders for this visit:  Viral URI  -     COVID DIAGNOSTIC TEST  -     COVID-19 ANTIGEN TEST ([facility])    -Rapid COVID negative  -Patient cannot return to normal public activities (work, school etc) until ALL THREE isolation criteria* are met:  1) Fevers have fully resolved for 24 HOURS without requiring fever-reducing meds  2) Any symptoms have BEGUN TO IMPROVE  3) 10 days have passed since onset of symptoms    *(CDC isolation criteria updated 7/22/20)    Supportive care.  See AVS for patient instructions.    All questions answered and patient (parent if applicable) in agreement with treatment and discharge plan. Patient appropriately stable at time of discharge from urgent care clinic. The provisional diagnosis that the patient is discharged with today was based on the history taken, presenting symptoms, physical exam, and/or any ancillary testing. Patient (parent if applicable) states understanding that often times the diagnosis can change. If new symptoms occur or worsen, patient should seek immediate medical attention for re-evaluation. Follow up with Primary Care Provider as discussed in the after visit summary.      See the patient discharge instructions section for additional instructions, follow-up plans and/or ER precautions discussed with the patient.    Collaborating Physician Dr. Donal Brumfield,  MD.    Universal precautions maintained as well as appropriate PPE worn by provider during all patient interactions and exam.

## 2023-06-01 ENCOUNTER — OFFICE VISIT (OUTPATIENT)
Dept: FAMILY MEDICINE | Facility: OTHER | Age: 38
End: 2023-06-01
Attending: PHYSICIAN ASSISTANT
Payer: COMMERCIAL

## 2023-06-01 VITALS
OXYGEN SATURATION: 97 % | SYSTOLIC BLOOD PRESSURE: 134 MMHG | HEIGHT: 73 IN | HEART RATE: 74 BPM | WEIGHT: 308 LBS | TEMPERATURE: 97.6 F | BODY MASS INDEX: 40.82 KG/M2 | DIASTOLIC BLOOD PRESSURE: 87 MMHG | RESPIRATION RATE: 18 BRPM

## 2023-06-01 DIAGNOSIS — F41.9 ANXIETY: ICD-10-CM

## 2023-06-01 DIAGNOSIS — I10 BENIGN ESSENTIAL HYPERTENSION: ICD-10-CM

## 2023-06-01 DIAGNOSIS — E03.9 ACQUIRED HYPOTHYROIDISM: Primary | ICD-10-CM

## 2023-06-01 LAB
T4 FREE SERPL-MCNC: 0.8 NG/DL (ref 0.9–1.7)
TSH SERPL DL<=0.005 MIU/L-ACNC: 16.33 UIU/ML (ref 0.3–4.2)

## 2023-06-01 PROCEDURE — G0463 HOSPITAL OUTPT CLINIC VISIT: HCPCS

## 2023-06-01 PROCEDURE — 84443 ASSAY THYROID STIM HORMONE: CPT | Mod: ZL | Performed by: PHYSICIAN ASSISTANT

## 2023-06-01 PROCEDURE — 84439 ASSAY OF FREE THYROXINE: CPT | Mod: ZL | Performed by: PHYSICIAN ASSISTANT

## 2023-06-01 PROCEDURE — 99213 OFFICE O/P EST LOW 20 MIN: CPT | Performed by: PHYSICIAN ASSISTANT

## 2023-06-01 PROCEDURE — 36415 COLL VENOUS BLD VENIPUNCTURE: CPT | Mod: ZL | Performed by: PHYSICIAN ASSISTANT

## 2023-06-01 RX ORDER — METOPROLOL TARTRATE 50 MG
50 TABLET ORAL 2 TIMES DAILY
Qty: 180 TABLET | Refills: 1 | Status: SHIPPED | OUTPATIENT
Start: 2023-06-01 | End: 2023-08-17

## 2023-06-01 RX ORDER — CLONAZEPAM 1 MG/1
1 TABLET ORAL 2 TIMES DAILY PRN
Qty: 60 TABLET | Refills: 1 | Status: SHIPPED | OUTPATIENT
Start: 2023-06-01 | End: 2023-08-17

## 2023-06-01 ASSESSMENT — PAIN SCALES - GENERAL: PAINLEVEL: NO PAIN (0)

## 2023-06-01 NOTE — PROGRESS NOTES
Assessment & Plan     Acquired hypothyroidism  He needs a recheck. We wrote a letter for him to his insurance why we do not have him back at work. His anxiety is still very high.  Emotionally he is doing better.  But still not stable.  His levels were still quite high last check.   - TSH with free T4 reflex; Future    Benign essential hypertension  Refill for him to see us back in 3 months.   - metoprolol tartrate (LOPRESSOR) 50 MG tablet; Take 1 tablet (50 mg) by mouth 2 times daily    Anxiety  We did refill this. He feels it is needed.    - clonazePAM (KLONOPIN) 1 MG tablet; Take 1 tablet (1 mg) by mouth 2 times daily as needed for anxiety    Review of external notes as documented elsewhere in note  Ordering of each unique test  Prescription drug management  10  minutes spent by me on the date of the encounter doing chart review, history and exam, documentation and further activities per the note       See Patient Instructions    No follow-ups on file.    FRANCO Trevino  [facility] - SONJA Pitts is a 38 year old, presenting for the following health issues:  Follow Up        6/1/2023     2:52 PM   Additional Questions   Roomed by nikia cuellar   Accompanied by self     HPI     Hypertension Follow-up      Do you check your blood pressure regularly outside of the clinic? No     Are you following a low salt diet? No    Are your blood pressures ever more than 140 on the top number (systolic) OR more   than 90 on the bottom number (diastolic), for example 140/90? Yes    Hypothyroidism Follow-up      Since last visit, patient describes the following symptoms: anxiety and fatigue            Review of Systems   Constitutional, HEENT, cardiovascular, pulmonary, gi and gu systems are negative, except as otherwise noted.      Objective    /87 (BP Location: Left arm, Patient Position: Sitting, Cuff Size: Adult Large)   Pulse 74   Temp 97.6  F (36.4  C) (Tympanic)   Resp 18   Ht  "1.854 m (6' 1\")   Wt 139.7 kg (308 lb)   SpO2 97%   BMI 40.64 kg/m    Body mass index is 40.64 kg/m .  Physical Exam   GENERAL: healthy, alert and no distress  EYES: Eyes grossly normal to inspection, PERRL and conjunctivae and sclerae normal  HENT: ear canals and TM's normal, nose and mouth without ulcers or lesions  NECK: no adenopathy, no asymmetry, masses, or scars and thyroid normal to palpation  RESP: lungs clear to auscultation - no rales, rhonchi or wheezes  CV: regular rate and rhythm, normal S1 S2, no S3 or S4, no murmur, click or rub, no peripheral edema and peripheral pulses strong  PSYCH: mentation appears normal, affect normal/bright    Office Visit on 04/19/2023   Component Date Value Ref Range Status     TSH 04/19/2023 28.49 (H)  0.30 - 4.20 uIU/mL Final     Free T4 04/19/2023 0.59 (L)  0.90 - 1.70 ng/dL Final                   "

## 2023-06-01 NOTE — LETTER
[facility] [place]  [address]  [place] [address]  Phone: [phone]    June 1, 2023        Hong Murillo  [address]   [facility] [id number]          To whom it may concern:    RE: Hong Murillo    Patient was seen and treated today at our clinic.  He has severe hypothyroidism.  This also is affecting his blood pressure and his mood.  Has severe anxiety and panic with all the medications and dose adjustments we are doing monthly.  This will be on going for the next 3 months.   The Thyroid Gland is a very slowly changing gland and takes at least 6 weeks to see if a dose adjustment is in range     Please contact me for questions or concerns.      Sincerely,        FRANCO Trevino

## 2023-06-05 DIAGNOSIS — E03.9 ACQUIRED HYPOTHYROIDISM: ICD-10-CM

## 2023-06-05 RX ORDER — LEVOTHYROXINE SODIUM 150 UG/1
150 TABLET ORAL DAILY
Qty: 90 TABLET | Refills: 1 | Status: SHIPPED | OUTPATIENT
Start: 2023-06-05 | End: 2023-08-17

## 2023-06-05 NOTE — RESULT ENCOUNTER NOTE
Let lizbeth know to take two pills of what he has at home and then start with one pill as he is picking them up.

## 2023-06-12 NOTE — RESULT ENCOUNTER NOTE
You need to be on 150 mg so you had 75 at home and now you have 150 and we will recheck on your next appointment.  I was starting you slow and think we can just go for it now that you are doing better.

## 2023-07-13 ENCOUNTER — TELEPHONE (OUTPATIENT)
Dept: FAMILY MEDICINE | Facility: OTHER | Age: 38
End: 2023-07-13

## 2023-07-13 DIAGNOSIS — E03.9 ACQUIRED HYPOTHYROIDISM: Primary | ICD-10-CM

## 2023-07-13 NOTE — TELEPHONE ENCOUNTER
To: nurse to Desiree Loomis    Patient was in late today and wanted to reschedule.... went back to ask you about it and you (and Desiree) were in room, he said no big deal reschedule... after checking for next available (over month) he requested to be seen sooner than that.  Please return his call @ [phone]    Thank you

## 2023-07-14 NOTE — TELEPHONE ENCOUNTER
Attempted to reach patient to schedule for lab only but call did not go through. Will call another time.

## 2023-07-20 ENCOUNTER — LAB (OUTPATIENT)
Dept: LAB | Facility: OTHER | Age: 38
End: 2023-07-20
Payer: COMMERCIAL

## 2023-07-20 DIAGNOSIS — E03.9 ACQUIRED HYPOTHYROIDISM: ICD-10-CM

## 2023-07-20 LAB — TSH SERPL DL<=0.005 MIU/L-ACNC: 0.75 UIU/ML (ref 0.3–4.2)

## 2023-07-20 PROCEDURE — 36415 COLL VENOUS BLD VENIPUNCTURE: CPT | Mod: ZL

## 2023-07-20 PROCEDURE — 84443 ASSAY THYROID STIM HORMONE: CPT | Mod: ZL

## 2023-07-20 PROCEDURE — 84481 FREE ASSAY (FT-3): CPT | Mod: ZL

## 2023-07-20 NOTE — LETTER
August 2, 2023      Hong Murillo     [facility] [id number]        Dear ,    We are writing to inform you of your test results.    {results letter list:076035}    Resulted Orders   TSH with free T4 reflex   Result Value Ref Range    TSH 0.75 0.30 - 4.20 uIU/mL   T3 Free   Result Value Ref Range    T3 Free 3.5 2.0 - 4.4 pg/mL       If you have any questions or concerns, please call the clinic at the number listed above.       Sincerely,      Desiree Loomis PA

## 2023-07-21 LAB — T3FREE SERPL-MCNC: 3.5 PG/ML (ref 2–4.4)

## 2023-08-02 ENCOUNTER — TELEPHONE (OUTPATIENT)
Dept: FAMILY MEDICINE | Facility: OTHER | Age: 38
End: 2023-08-02

## 2023-08-02 NOTE — RESULT ENCOUNTER NOTE
For throat pain he can. We need to see him back I hope you did make his appointment since we did not see him as he was late.  DISPLAY PLAN FREE TEXT

## 2023-08-02 NOTE — TELEPHONE ENCOUNTER
3:29 PM    Reason for Call: Phone Call    Description: pt called back was returning a call from the nurse    Was an appointment offered for this call? No  If yes : Appointment type              Date    Preferred method for responding to this message: Telephone Call  What is your phone number ? [phone]    If we cannot reach you directly, may we leave a detailed response at the number you provided? Yes    Can this message wait until your PCP/provider returns, if available today? Not applicable    Jade Hancock

## 2023-08-17 ENCOUNTER — OFFICE VISIT (OUTPATIENT)
Dept: FAMILY MEDICINE | Facility: OTHER | Age: 38
End: 2023-08-17
Attending: PHYSICIAN ASSISTANT
Payer: COMMERCIAL

## 2023-08-17 VITALS
DIASTOLIC BLOOD PRESSURE: 90 MMHG | SYSTOLIC BLOOD PRESSURE: 128 MMHG | BODY MASS INDEX: 39.58 KG/M2 | OXYGEN SATURATION: 96 % | WEIGHT: 300 LBS | TEMPERATURE: 97.9 F | RESPIRATION RATE: 16 BRPM | HEART RATE: 85 BPM

## 2023-08-17 DIAGNOSIS — E03.9 ACQUIRED HYPOTHYROIDISM: Primary | ICD-10-CM

## 2023-08-17 DIAGNOSIS — F41.9 ANXIETY: ICD-10-CM

## 2023-08-17 DIAGNOSIS — E06.0 THYROIDITIS, ACUTE: ICD-10-CM

## 2023-08-17 DIAGNOSIS — R49.1 LOSS OF VOICE: ICD-10-CM

## 2023-08-17 DIAGNOSIS — I10 BENIGN ESSENTIAL HYPERTENSION: ICD-10-CM

## 2023-08-17 DIAGNOSIS — E89.0 POSTABLATIVE HYPOTHYROIDISM: ICD-10-CM

## 2023-08-17 LAB
T4 FREE SERPL-MCNC: 1.19 NG/DL (ref 0.9–1.7)
TSH SERPL DL<=0.005 MIU/L-ACNC: 0.8 UIU/ML (ref 0.3–4.2)

## 2023-08-17 PROCEDURE — 84443 ASSAY THYROID STIM HORMONE: CPT | Mod: ZL | Performed by: PHYSICIAN ASSISTANT

## 2023-08-17 PROCEDURE — 84439 ASSAY OF FREE THYROXINE: CPT | Mod: ZL | Performed by: PHYSICIAN ASSISTANT

## 2023-08-17 PROCEDURE — 99214 OFFICE O/P EST MOD 30 MIN: CPT | Performed by: PHYSICIAN ASSISTANT

## 2023-08-17 PROCEDURE — G0463 HOSPITAL OUTPT CLINIC VISIT: HCPCS

## 2023-08-17 PROCEDURE — 36415 COLL VENOUS BLD VENIPUNCTURE: CPT | Mod: ZL | Performed by: PHYSICIAN ASSISTANT

## 2023-08-17 RX ORDER — HYDROCHLOROTHIAZIDE 12.5 MG/1
25 CAPSULE ORAL EVERY MORNING
Qty: 60 CAPSULE | Refills: 4 | Status: SHIPPED | OUTPATIENT
Start: 2023-08-17

## 2023-08-17 RX ORDER — CLONAZEPAM 1 MG/1
1 TABLET ORAL 2 TIMES DAILY PRN
Qty: 60 TABLET | Refills: 1 | Status: SHIPPED | OUTPATIENT
Start: 2023-08-17 | End: 2024-01-09

## 2023-08-17 RX ORDER — METOPROLOL TARTRATE 50 MG
50 TABLET ORAL 2 TIMES DAILY
Qty: 180 TABLET | Refills: 1 | Status: SHIPPED | OUTPATIENT
Start: 2023-08-17 | End: 2024-06-18

## 2023-08-17 RX ORDER — TRAMADOL HYDROCHLORIDE 50 MG/1
50 TABLET ORAL
COMMUNITY
Start: 2022-07-18 | End: 2023-08-17

## 2023-08-17 RX ORDER — LEVOTHYROXINE SODIUM 150 UG/1
150 TABLET ORAL DAILY
Qty: 90 TABLET | Refills: 1 | Status: SHIPPED | OUTPATIENT
Start: 2023-08-17 | End: 2024-03-21

## 2023-08-17 RX ORDER — IBUPROFEN 800 MG/1
800 TABLET, FILM COATED ORAL EVERY 8 HOURS PRN
Qty: 60 TABLET | Refills: 3 | Status: SHIPPED | OUTPATIENT
Start: 2023-08-17 | End: 2024-03-21

## 2023-08-17 RX ORDER — ESCITALOPRAM OXALATE 10 MG/1
10 TABLET ORAL DAILY
Qty: 30 TABLET | Refills: 3 | Status: SHIPPED | OUTPATIENT
Start: 2023-08-17

## 2023-08-17 ASSESSMENT — ANXIETY QUESTIONNAIRES
5. BEING SO RESTLESS THAT IT IS HARD TO SIT STILL: NOT AT ALL
GAD7 TOTAL SCORE: 4
IF YOU CHECKED OFF ANY PROBLEMS ON THIS QUESTIONNAIRE, HOW DIFFICULT HAVE THESE PROBLEMS MADE IT FOR YOU TO DO YOUR WORK, TAKE CARE OF THINGS AT HOME, OR GET ALONG WITH OTHER PEOPLE: SOMEWHAT DIFFICULT
3. WORRYING TOO MUCH ABOUT DIFFERENT THINGS: SEVERAL DAYS
1. FEELING NERVOUS, ANXIOUS, OR ON EDGE: SEVERAL DAYS
7. FEELING AFRAID AS IF SOMETHING AWFUL MIGHT HAPPEN: NOT AT ALL
6. BECOMING EASILY ANNOYED OR IRRITABLE: SEVERAL DAYS
2. NOT BEING ABLE TO STOP OR CONTROL WORRYING: NOT AT ALL
GAD7 TOTAL SCORE: 4

## 2023-08-17 ASSESSMENT — PATIENT HEALTH QUESTIONNAIRE - PHQ9
5. POOR APPETITE OR OVEREATING: SEVERAL DAYS
SUM OF ALL RESPONSES TO PHQ QUESTIONS 1-9: 0

## 2023-08-17 ASSESSMENT — PAIN SCALES - GENERAL: PAINLEVEL: NO PAIN (0)

## 2023-08-17 NOTE — PROGRESS NOTES
Assessment & Plan     Acquired hypothyroidism  She is going to be given recheck on his thyroid.  Is on Synthroid 150 mcg.  He is now got his hoarse voice back.   - TSH with free T4 reflex; Future    Anxiety  He is going to be given refill. It is much better life is better.    - clonazePAM (KLONOPIN) 1 MG tablet; Take 1 tablet (1 mg) by mouth 2 times daily as needed for anxiety  - escitalopram (LEXAPRO) 10 MG tablet; Take 1 tablet (10 mg) by mouth daily    Loss of voice  This has been going on 6 months. We tried PPI I thought this was related to his thryoid as when he came in he had a terrible case of thyroiditis.   - Adult ENT  Referral; Future    Benign essential hypertension  Still too high. We will have him add in Microzide.   - hydrochlorothiazide (MICROZIDE) 12.5 MG capsule; Take 2 capsules (25 mg) by mouth every morning  - metoprolol tartrate (LOPRESSOR) 50 MG tablet; Take 1 tablet (50 mg) by mouth 2 times daily    Thyroiditis, acute  When backed down was painful on his ibuprofen.  Today is ok.  On twice per day.   - ibuprofen (ADVIL/MOTRIN) 800 MG tablet; Take 1 tablet (800 mg) by mouth every 8 hours as needed for moderate pain    Ordering of each unique test  Prescription drug management  15  minutes spent by me on the date of the encounter doing chart review, history and exam, documentation and further activities per the note       See Patient Instructions    No follow-ups on file.    FRANCO Trevino  [facility] - SONJA Pitts is a 38 year old, presenting for the following health issues:  MH Follow Up and Thyroid Problem        8/17/2023     1:30 PM   Additional Questions   Roomed by Miles Howe LPN   Accompanied by self         8/17/2023     1:30 PM   Patient Reported Additional Medications   Patient reports taking the following new medications none       HPI     Depression and Anxiety Follow-Up  How are you doing with your depression since your last  visit? Improved has not had much depression symptoms  How are you doing with your anxiety since your last visit?  Improved but still having some anxiety here and there. Not having it every .  Are you having other symptoms that might be associated with depression or anxiety? No  Have you had a significant life event? OTHER: daughter got accepted into college    Do you have any concerns with your use of alcohol or other drugs? No    Social History     Tobacco Use    Smoking status: Every Day     Packs/day: 0.50     Years: 15.00     Pack years: 7.50     Types: Cigarettes     Passive exposure: Current    Smokeless tobacco: Never    Tobacco comments:     Ready to quit again   Vaping Use    Vaping Use: Never used   Substance Use Topics    Alcohol use: No     Comment: rarely    Drug use: Yes     Comment: marijuana- last time used yesterday         3/6/2017    10:05 AM 3/9/2023     8:27 AM 8/17/2023     1:00 PM   PHQ   PHQ-9 Total Score 2 0 0   Q9: Thoughts of better off dead/self-harm past 2 weeks Not at all Not at all Not at all         3/6/2017    10:05 AM 3/9/2023     8:28 AM 8/17/2023     1:00 PM   NORM-7 SCORE   Total Score 18 4 4         8/17/2023     1:00 PM   Last PHQ-9   1.  Little interest or pleasure in doing things 0   2.  Feeling down, depressed, or hopeless 0   3.  Trouble falling or staying asleep, or sleeping too much 0   4.  Feeling tired or having little energy 0   5.  Poor appetite or overeating 0   6.  Feeling bad about yourself 0   7.  Trouble concentrating 0   8.  Moving slowly or restless 0   Q9: Thoughts of better off dead/self-harm past 2 weeks 0   PHQ-9 Total Score 0         8/17/2023     1:00 PM   NORM-7    1. Feeling nervous, anxious, or on edge 1   2. Not being able to stop or control worrying 0   3. Worrying too much about different things 1   4. Trouble relaxing 1   5. Being so restless that it is hard to sit still 0   6. Becoming easily annoyed or irritable 1   7. Feeling afraid, as if  something awful might happen 0   NORM-7 Total Score 4   If you checked any problems, how difficult have they made it for you to do your work, take care of things at home, or get along with other people? Somewhat difficult       Suicide Assessment Five-step Evaluation and Treatment (SAFE-T)    Hypothyroidism Follow-up    Since last visit, patient describes the following symptoms: Weight stable, no hair loss, no skin changes, no constipation, no loose stools and fatigue    Currently dealing with some edema in legs/ankles, and spots on lower legs         Review of Systems   Constitutional, HEENT, cardiovascular, pulmonary, gi and gu systems are negative, except as otherwise noted.      Objective    BP (!) 128/90 (BP Location: Left arm, Patient Position: Sitting, Cuff Size: Adult Large)   Pulse 85   Temp 97.9  F (36.6  C) (Tympanic)   Resp 16   Wt 136.1 kg (300 lb)   SpO2 96%   BMI 39.58 kg/m    Body mass index is 39.58 kg/m .  Physical Exam   GENERAL: healthy, alert and no distress  EYES: Eyes grossly normal to inspection, PERRL and conjunctivae and sclerae normal  HENT: ear canals and TM's normal, nose and mouth without ulcers or lesions  NECK: no adenopathy, no asymmetry, masses, or scars and thyroid normal to palpation  RESP: lungs clear to auscultation - no rales, rhonchi or wheezes  CV: regular rate and rhythm, normal S1 S2, no S3 or S4, no murmur, click or rub, no peripheral edema and peripheral pulses strong  MS: no gross musculoskeletal defects noted, no edema  PSYCH: mentation appears normal, affect normal/bright,mood is much better. He is happy and laughing. Has been doing things he likes.     Lab on 07/20/2023   Component Date Value Ref Range Status    TSH 07/20/2023 0.75  0.30 - 4.20 uIU/mL Final    T3 Free 07/20/2023 3.5  2.0 - 4.4 pg/mL Final

## 2023-11-16 NOTE — PROGRESS NOTES
"  Assessment & Plan   Acquired hypothyroidism  TSH   Date Value Ref Range Status   11/17/2023 0.51 0.30 - 4.20 uIU/mL Final   03/06/2017 0.26 (L) 0.40 - 4.00 mU/L Final     He is perfect on his labs. He feels great.  He is finally in range and so at this point we will see him back in 3 month per his request.    - Basic metabolic panel; Future  - Basic metabolic panel    Graves disease  Recheck his liver and his thryoid today.  Euthyroid. Refill given.   - TSH with free T4 reflex; Future  - Hepatic panel (Albumin, ALT, AST, Bili, Alk Phos, TP); Future  - TSH with free T4 reflex  - Hepatic panel (Albumin, ALT, AST, Bili, Alk Phos, TP)    NORM (generalized anxiety disorder)  Mood is finally in a good place. Not had any panic, sleeping better, no racing of he heart.   - CBC with platelets and differential; Future  - CBC with platelets and differential    Hypokalemia  We need to follow up on this.  Basic is done today.   - CBC with platelets and differential; Future  - Basic metabolic panel; Future  - CBC with platelets and differential  - Basic metabolic panel    Ordering of each unique test  5 minutes spent by me on the date of the encounter doing chart review, history and exam, documentation and further activities per the note       BMI:   Estimated body mass index is 41.9 kg/m  as calculated from the following:    Height as of this encounter: 1.82 m (5' 11.65\").    Weight as of this encounter: 138.8 kg (306 lb).   Weight management plan: Discussed healthy diet and exercise guidelines    See Patient Instructions    No follow-ups on file.    FRANCO Trevino  [facility] - SONJA Pitts is a 38 year old, presenting for the following health issues:  Follow Up        11/17/2023     2:15 PM   Additional Questions   Roomed by Miles Howe LPN   Accompanied by self         11/17/2023     2:15 PM   Patient Reported Additional Medications   Patient reports taking the following new " medications none       HPI     Depression and Anxiety Follow-Up  How are you doing with your depression since your last visit? Improved   How are you doing with your anxiety since your last visit?  Improved but still has some anxiety  Are you having other symptoms that might be associated with depression or anxiety? No  Have you had a significant life event? No   Do you have any concerns with your use of alcohol or other drugs? No    Social History     Tobacco Use    Smoking status: Every Day     Packs/day: 0.50     Years: 15.00     Additional pack years: 0.00     Total pack years: 7.50     Types: Cigarettes     Passive exposure: Current    Smokeless tobacco: Never    Tobacco comments:     Ready to quit again   Vaping Use    Vaping Use: Never used   Substance Use Topics    Alcohol use: No     Comment: rarely    Drug use: Yes     Comment: marijuana- last time used yesterday         3/9/2023     8:27 AM 8/17/2023     1:00 PM 11/17/2023     2:01 PM   PHQ   PHQ-9 Total Score 0 0 0   Q9: Thoughts of better off dead/self-harm past 2 weeks Not at all Not at all Not at all         3/9/2023     8:28 AM 8/17/2023     1:00 PM 11/17/2023     2:02 PM   NORM-7 SCORE   Total Score   4 (minimal anxiety)   Total Score 4 4 4         11/17/2023     2:01 PM   Last PHQ-9   1.  Little interest or pleasure in doing things 0   2.  Feeling down, depressed, or hopeless 0   3.  Trouble falling or staying asleep, or sleeping too much 0   4.  Feeling tired or having little energy 0   5.  Poor appetite or overeating 0   6.  Feeling bad about yourself 0   7.  Trouble concentrating 0   8.  Moving slowly or restless 0   Q9: Thoughts of better off dead/self-harm past 2 weeks 0   PHQ-9 Total Score 0         11/17/2023     2:02 PM   NORM-7    1. Feeling nervous, anxious, or on edge 1   2. Not being able to stop or control worrying 1   3. Worrying too much about different things 1   4. Trouble relaxing 1   5. Being so restless that it is hard to sit  still 0   6. Becoming easily annoyed or irritable 0   7. Feeling afraid, as if something awful might happen 0   NORM-7 Total Score 4   If you checked any problems, how difficult have they made it for you to do your work, take care of things at home, or get along with other people? Somewhat difficult       Suicide Assessment Five-step Evaluation and Treatment (SAFE-T)    Hypothyroidism Follow-up    Since last visit, patient describes the following symptoms: Weight stable, no hair loss, no skin changes, no constipation, no loose stools    Anxiety Follow-Up  How are you doing with your anxiety since your last visit? Improved on medication  Are you having other symptoms that might be associated with anxiety? No  Have you had a significant life event? No   Are you feeling depressed? No  Do you have any concerns with your use of alcohol or other drugs? No    Social History     Tobacco Use    Smoking status: Every Day     Packs/day: 0.50     Years: 15.00     Additional pack years: 0.00     Total pack years: 7.50     Types: Cigarettes     Passive exposure: Current    Smokeless tobacco: Never    Tobacco comments:     Ready to quit again   Vaping Use    Vaping Use: Never used   Substance Use Topics    Alcohol use: No     Comment: rarely    Drug use: Yes     Comment: marijuana- last time used yesterday         3/9/2023     8:28 AM 8/17/2023     1:00 PM 11/17/2023     2:02 PM   NORM-7 SCORE   Total Score   4 (minimal anxiety)   Total Score 4 4 4         3/9/2023     8:27 AM 8/17/2023     1:00 PM 11/17/2023     2:01 PM   PHQ   PHQ-9 Total Score 0 0 0   Q9: Thoughts of better off dead/self-harm past 2 weeks Not at all Not at all Not at all         11/17/2023     2:01 PM   Last PHQ-9   1.  Little interest or pleasure in doing things 0   2.  Feeling down, depressed, or hopeless 0   3.  Trouble falling or staying asleep, or sleeping too much 0   4.  Feeling tired or having little energy 0   5.  Poor appetite or overeating 0   6.   "Feeling bad about yourself 0   7.  Trouble concentrating 0   8.  Moving slowly or restless 0   Q9: Thoughts of better off dead/self-harm past 2 weeks 0   PHQ-9 Total Score 0         11/17/2023     2:02 PM   NORM-7    1. Feeling nervous, anxious, or on edge 1   2. Not being able to stop or control worrying 1   3. Worrying too much about different things 1   4. Trouble relaxing 1   5. Being so restless that it is hard to sit still 0   6. Becoming easily annoyed or irritable 0   7. Feeling afraid, as if something awful might happen 0   NORM-7 Total Score 4   If you checked any problems, how difficult have they made it for you to do your work, take care of things at home, or get along with other people? Somewhat difficult         Review of Systems   Constitutional, HEENT, cardiovascular, pulmonary, gi and gu systems are negative, except as otherwise noted.      Objective    /85 (BP Location: Left arm, Patient Position: Sitting, Cuff Size: Adult Large)   Pulse 95   Temp 99.2  F (37.3  C) (Tympanic)   Resp 16   Ht 1.82 m (5' 11.65\")   Wt 138.8 kg (306 lb)   SpO2 95%   BMI 41.90 kg/m    Body mass index is 41.9 kg/m .  Physical Exam   GENERAL: healthy, alert and no distress  NECK: no adenopathy, no asymmetry, masses, or scars and thyroid normal to palpation  RESP: lungs clear to auscultation - no rales, rhonchi or wheezes  CV: regular rate and rhythm, normal S1 S2, no S3 or S4, no murmur, click or rub, no peripheral edema and peripheral pulses strong  ABDOMEN: soft, nontender, no hepatosplenomegaly, no masses and bowel sounds normal  MS: no gross musculoskeletal defects noted, no edema  SKIN: no suspicious lesions or rashes  NEURO: Normal strength and tone, mentation intact and speech normal  PSYCH: mentation appears normal, affect normal/bright    Results for orders placed or performed in visit on 11/17/23   TSH with free T4 reflex     Status: Normal   Result Value Ref Range    TSH 0.51 0.30 - 4.20 uIU/mL "   Basic metabolic panel     Status: Abnormal   Result Value Ref Range    Sodium 139 135 - 145 mmol/L    Potassium 4.5 3.4 - 5.3 mmol/L    Chloride 102 98 - 107 mmol/L    Carbon Dioxide (CO2) 25 22 - 29 mmol/L    Anion Gap 12 7 - 15 mmol/L    Urea Nitrogen 18.5 6.0 - 20.0 mg/dL    Creatinine 1.08 0.67 - 1.17 mg/dL    GFR Estimate 89 >60 mL/min/1.73m2    Calcium 9.8 8.6 - 10.0 mg/dL    Glucose 105 (H) 70 - 99 mg/dL   Hepatic panel (Albumin, ALT, AST, Bili, Alk Phos, TP)     Status: Normal   Result Value Ref Range    Protein Total 7.6 6.4 - 8.3 g/dL    Albumin 4.5 3.5 - 5.2 g/dL    Bilirubin Total 0.2 <=1.2 mg/dL    Alkaline Phosphatase 106 40 - 150 U/L    AST 34 0 - 45 U/L    ALT 45 0 - 70 U/L    Bilirubin Direct <0.20 0.00 - 0.30 mg/dL   CBC with platelets and differential     Status: None   Result Value Ref Range    WBC Count 11.0 4.0 - 11.0 10e3/uL    RBC Count 4.69 4.40 - 5.90 10e6/uL    Hemoglobin 14.1 13.3 - 17.7 g/dL    Hematocrit 41.2 40.0 - 53.0 %    MCV 88 78 - 100 fL    MCH 30.1 26.5 - 33.0 pg    MCHC 34.2 31.5 - 36.5 g/dL    RDW 12.5 10.0 - 15.0 %    Platelet Count 217 150 - 450 10e3/uL    % Neutrophils 59 %    % Lymphocytes 29 %    % Monocytes 9 %    % Eosinophils 2 %    % Basophils 1 %    % Immature Granulocytes 0 %    NRBCs per 100 WBC 0 <1 /100    Absolute Neutrophils 6.5 1.6 - 8.3 10e3/uL    Absolute Lymphocytes 3.2 0.8 - 5.3 10e3/uL    Absolute Monocytes 1.0 0.0 - 1.3 10e3/uL    Absolute Eosinophils 0.2 0.0 - 0.7 10e3/uL    Absolute Basophils 0.1 0.0 - 0.2 10e3/uL    Absolute Immature Granulocytes 0.0 <=0.4 10e3/uL    Absolute NRBCs 0.0 10e3/uL   CBC with platelets and differential     Status: None    Narrative    The following orders were created for panel order CBC with platelets and differential.  Procedure                               Abnormality         Status                     ---------                               -----------         ------                     CBC with platelets and  d...[[id number]]                      Final result                 Please view results for these tests on the individual orders.                     Answers submitted by the patient for this visit:  Patient Health Questionnaire (Submitted on 11/17/2023)  If you checked off any problems, how difficult have these problems made it for you to do your work, take care of things at home, or get along with other people?: Not difficult at all  PHQ9 TOTAL SCORE: 0  NORM-7 (Submitted on 11/17/2023)  NORM 7 TOTAL SCORE: 4

## 2023-11-17 ENCOUNTER — OFFICE VISIT (OUTPATIENT)
Dept: FAMILY MEDICINE | Facility: OTHER | Age: 38
End: 2023-11-17
Attending: PHYSICIAN ASSISTANT

## 2023-11-17 VITALS
RESPIRATION RATE: 16 BRPM | DIASTOLIC BLOOD PRESSURE: 85 MMHG | HEIGHT: 72 IN | SYSTOLIC BLOOD PRESSURE: 130 MMHG | WEIGHT: 306 LBS | TEMPERATURE: 99.2 F | OXYGEN SATURATION: 95 % | BODY MASS INDEX: 41.45 KG/M2 | HEART RATE: 95 BPM

## 2023-11-17 DIAGNOSIS — F41.1 GAD (GENERALIZED ANXIETY DISORDER): ICD-10-CM

## 2023-11-17 DIAGNOSIS — E05.00 GRAVES DISEASE: Primary | ICD-10-CM

## 2023-11-17 DIAGNOSIS — E87.6 HYPOKALEMIA: ICD-10-CM

## 2023-11-17 DIAGNOSIS — E03.9 ACQUIRED HYPOTHYROIDISM: ICD-10-CM

## 2023-11-17 LAB
ALBUMIN SERPL BCG-MCNC: 4.5 G/DL (ref 3.5–5.2)
ALP SERPL-CCNC: 106 U/L (ref 40–150)
ALT SERPL W P-5'-P-CCNC: 45 U/L (ref 0–70)
ANION GAP SERPL CALCULATED.3IONS-SCNC: 12 MMOL/L (ref 7–15)
AST SERPL W P-5'-P-CCNC: 34 U/L (ref 0–45)
BASOPHILS # BLD AUTO: 0.1 10E3/UL (ref 0–0.2)
BASOPHILS NFR BLD AUTO: 1 %
BILIRUB DIRECT SERPL-MCNC: <0.2 MG/DL (ref 0–0.3)
BILIRUB SERPL-MCNC: 0.2 MG/DL
BUN SERPL-MCNC: 18.5 MG/DL (ref 6–20)
CALCIUM SERPL-MCNC: 9.8 MG/DL (ref 8.6–10)
CHLORIDE SERPL-SCNC: 102 MMOL/L (ref 98–107)
CREAT SERPL-MCNC: 1.08 MG/DL (ref 0.67–1.17)
DEPRECATED HCO3 PLAS-SCNC: 25 MMOL/L (ref 22–29)
EGFRCR SERPLBLD CKD-EPI 2021: 89 ML/MIN/1.73M2
EOSINOPHIL # BLD AUTO: 0.2 10E3/UL (ref 0–0.7)
EOSINOPHIL NFR BLD AUTO: 2 %
ERYTHROCYTE [DISTWIDTH] IN BLOOD BY AUTOMATED COUNT: 12.5 % (ref 10–15)
GLUCOSE SERPL-MCNC: 105 MG/DL (ref 70–99)
HCT VFR BLD AUTO: 41.2 % (ref 40–53)
HGB BLD-MCNC: 14.1 G/DL (ref 13.3–17.7)
IMM GRANULOCYTES # BLD: 0 10E3/UL
IMM GRANULOCYTES NFR BLD: 0 %
LYMPHOCYTES # BLD AUTO: 3.2 10E3/UL (ref 0.8–5.3)
LYMPHOCYTES NFR BLD AUTO: 29 %
MCH RBC QN AUTO: 30.1 PG (ref 26.5–33)
MCHC RBC AUTO-ENTMCNC: 34.2 G/DL (ref 31.5–36.5)
MCV RBC AUTO: 88 FL (ref 78–100)
MONOCYTES # BLD AUTO: 1 10E3/UL (ref 0–1.3)
MONOCYTES NFR BLD AUTO: 9 %
NEUTROPHILS # BLD AUTO: 6.5 10E3/UL (ref 1.6–8.3)
NEUTROPHILS NFR BLD AUTO: 59 %
NRBC # BLD AUTO: 0 10E3/UL
NRBC BLD AUTO-RTO: 0 /100
PLATELET # BLD AUTO: 217 10E3/UL (ref 150–450)
POTASSIUM SERPL-SCNC: 4.5 MMOL/L (ref 3.4–5.3)
PROT SERPL-MCNC: 7.6 G/DL (ref 6.4–8.3)
RBC # BLD AUTO: 4.69 10E6/UL (ref 4.4–5.9)
SODIUM SERPL-SCNC: 139 MMOL/L (ref 135–145)
TSH SERPL DL<=0.005 MIU/L-ACNC: 0.51 UIU/ML (ref 0.3–4.2)
WBC # BLD AUTO: 11 10E3/UL (ref 4–11)

## 2023-11-17 PROCEDURE — 85025 COMPLETE CBC W/AUTO DIFF WBC: CPT | Mod: ZL | Performed by: PHYSICIAN ASSISTANT

## 2023-11-17 PROCEDURE — 36415 COLL VENOUS BLD VENIPUNCTURE: CPT | Mod: ZL | Performed by: PHYSICIAN ASSISTANT

## 2023-11-17 PROCEDURE — 84443 ASSAY THYROID STIM HORMONE: CPT | Mod: ZL | Performed by: PHYSICIAN ASSISTANT

## 2023-11-17 PROCEDURE — 99213 OFFICE O/P EST LOW 20 MIN: CPT | Performed by: PHYSICIAN ASSISTANT

## 2023-11-17 PROCEDURE — G0463 HOSPITAL OUTPT CLINIC VISIT: HCPCS

## 2023-11-17 PROCEDURE — 82248 BILIRUBIN DIRECT: CPT | Mod: ZL | Performed by: PHYSICIAN ASSISTANT

## 2023-11-17 PROCEDURE — 80053 COMPREHEN METABOLIC PANEL: CPT | Mod: ZL | Performed by: PHYSICIAN ASSISTANT

## 2023-11-17 ASSESSMENT — ANXIETY QUESTIONNAIRES
6. BECOMING EASILY ANNOYED OR IRRITABLE: NOT AT ALL
IF YOU CHECKED OFF ANY PROBLEMS ON THIS QUESTIONNAIRE, HOW DIFFICULT HAVE THESE PROBLEMS MADE IT FOR YOU TO DO YOUR WORK, TAKE CARE OF THINGS AT HOME, OR GET ALONG WITH OTHER PEOPLE: SOMEWHAT DIFFICULT
4. TROUBLE RELAXING: SEVERAL DAYS
GAD7 TOTAL SCORE: 4
7. FEELING AFRAID AS IF SOMETHING AWFUL MIGHT HAPPEN: NOT AT ALL
3. WORRYING TOO MUCH ABOUT DIFFERENT THINGS: SEVERAL DAYS
GAD7 TOTAL SCORE: 4
5. BEING SO RESTLESS THAT IT IS HARD TO SIT STILL: NOT AT ALL
2. NOT BEING ABLE TO STOP OR CONTROL WORRYING: SEVERAL DAYS
1. FEELING NERVOUS, ANXIOUS, OR ON EDGE: SEVERAL DAYS

## 2023-11-17 ASSESSMENT — PATIENT HEALTH QUESTIONNAIRE - PHQ9
SUM OF ALL RESPONSES TO PHQ QUESTIONS 1-9: 0
SUM OF ALL RESPONSES TO PHQ QUESTIONS 1-9: 0
10. IF YOU CHECKED OFF ANY PROBLEMS, HOW DIFFICULT HAVE THESE PROBLEMS MADE IT FOR YOU TO DO YOUR WORK, TAKE CARE OF THINGS AT HOME, OR GET ALONG WITH OTHER PEOPLE: NOT DIFFICULT AT ALL

## 2023-11-17 ASSESSMENT — PAIN SCALES - GENERAL: PAINLEVEL: NO PAIN (0)

## 2024-01-09 DIAGNOSIS — F41.9 ANXIETY: ICD-10-CM

## 2024-01-09 NOTE — TELEPHONE ENCOUNTER
Klonopin      Last Written Prescription Date:  11.21.23  Last Fill Quantity: #60,   # refills: 0  Last Office Visit: 11.17.23  Future Office visit:    Next 5 appointments (look out 90 days)      Feb 28, 2024  3:00 PM  (Arrive by 2:45 PM)  SHORT with FRANCO Brown  [facility] ([facility] [place] ) [address]  [place] [address]  [phone]             Routing refill request to provider for review/approval because:  Drug not on the [facility], [facility] or [facility] refill protocol or controlled substance

## 2024-01-10 RX ORDER — CLONAZEPAM 1 MG/1
1 TABLET ORAL 2 TIMES DAILY PRN
Qty: 60 TABLET | Refills: 0 | Status: SHIPPED | OUTPATIENT
Start: 2024-01-10 | End: 2024-02-22

## 2024-02-21 DIAGNOSIS — F41.9 ANXIETY: ICD-10-CM

## 2024-02-21 NOTE — TELEPHONE ENCOUNTER
Klonopin      Last Written Prescription Date:  01/10/24  Last Fill Quantity: 60,   # refills: 0  Last Office Visit: 11/17/23  Future Office visit:    Next 5 appointments (look out 90 days)      Feb 28, 2024  3:00 PM  (Arrive by 2:45 PM)  SHORT with FRANCO Brown  [facility] [place] ([facility] [place] ) [address]  [place] [address]  [phone]

## 2024-02-22 RX ORDER — CLONAZEPAM 1 MG/1
1 TABLET ORAL 2 TIMES DAILY PRN
Qty: 60 TABLET | Refills: 0 | Status: SHIPPED | OUTPATIENT
Start: 2024-02-22 | End: 2024-03-20

## 2024-03-20 DIAGNOSIS — F41.9 ANXIETY: ICD-10-CM

## 2024-03-20 RX ORDER — CLONAZEPAM 1 MG/1
1 TABLET ORAL 2 TIMES DAILY PRN
Qty: 60 TABLET | Refills: 1 | Status: SHIPPED | OUTPATIENT
Start: 2024-03-20 | End: 2024-06-18

## 2024-03-20 NOTE — TELEPHONE ENCOUNTER
Klonopin       Last Written Prescription Date:  2/22/2024  Last Fill Quantity: 60,   # refills: 0  Last Office Visit: 11/17/2023  Future Office visit:

## 2024-03-21 DIAGNOSIS — E03.9 ACQUIRED HYPOTHYROIDISM: ICD-10-CM

## 2024-03-21 DIAGNOSIS — E06.0 THYROIDITIS, ACUTE: ICD-10-CM

## 2024-03-21 RX ORDER — IBUPROFEN 800 MG/1
800 TABLET, FILM COATED ORAL EVERY 8 HOURS PRN
Qty: 60 TABLET | Refills: 0 | Status: SHIPPED | OUTPATIENT
Start: 2024-03-21

## 2024-03-21 RX ORDER — LEVOTHYROXINE SODIUM 150 UG/1
150 TABLET ORAL DAILY
Qty: 90 TABLET | Refills: 3 | Status: SHIPPED | OUTPATIENT
Start: 2024-03-21

## 2024-03-21 NOTE — TELEPHONE ENCOUNTER
LEVOTHYROXINE 150 MCG      Last Written Prescription Date:  8-17-23  Last Fill Quantity: 90,   # refills: 1    IBUPROFEN 800 MG      Last Written Prescription Date:  8-17-23  Last Fill Quantity: 60,   # refills: 3  Last Office Visit: 11-17-23

## 2024-03-21 NOTE — TELEPHONE ENCOUNTER
NSAID Medications [id number]/21/2024 09:42 AM   Protocol Details Always Fail Criteria - Chart Review Required

## 2024-06-18 DIAGNOSIS — F41.9 ANXIETY: ICD-10-CM

## 2024-06-18 DIAGNOSIS — I10 BENIGN ESSENTIAL HYPERTENSION: ICD-10-CM

## 2024-06-18 NOTE — TELEPHONE ENCOUNTER
Disp Refills Start End MIRIAM   clonazePAM (KLONOPIN) 1 MG tablet 60 tablet 1 3/20/2024 -- No      Disp Refills Start End MIRIAM   metoprolol tartrate (LOPRESSOR) 50 MG tablet 180 tablet 1 8/17/2023 -- No     Last Office Visit: 11/17/2023  Future Office visit:       Routing refill request to provider for review/approval because:

## 2024-06-19 RX ORDER — CLONAZEPAM 1 MG/1
1 TABLET ORAL 2 TIMES DAILY PRN
Qty: 60 TABLET | Refills: 1 | Status: SHIPPED | OUTPATIENT
Start: 2024-06-19

## 2024-06-19 RX ORDER — METOPROLOL TARTRATE 50 MG
50 TABLET ORAL 2 TIMES DAILY
Qty: 180 TABLET | Refills: 1 | Status: SHIPPED | OUTPATIENT
Start: 2024-06-19

## 2024-11-20 DIAGNOSIS — F41.9 ANXIETY: ICD-10-CM

## 2024-11-20 RX ORDER — CLONAZEPAM 1 MG/1
1 TABLET ORAL 2 TIMES DAILY PRN
Qty: 60 TABLET | Refills: 1 | Status: SHIPPED | OUTPATIENT
Start: 2024-11-20

## 2025-03-10 NOTE — ED AVS SNAPSHOT
[facility]  [address]  [place] [address]  Phone:  [phone]  Fax:  [phone]                                    Hong Murillo   MRN: [id number]    Department:  [facility]   Date of Visit:  11/3/2019           After Visit Summary Signature Page    I have received my discharge instructions, and my questions have been answered. I have discussed any challenges I see with this plan with the nurse or doctor.    ..........................................................................................................................................  Patient/Patient Representative Signature      ..........................................................................................................................................  Patient Representative Print Name and Relationship to Patient    ..................................................               ................................................  Date                                   Time    ..........................................................................................................................................  Reviewed by Signature/Title    ...................................................              ..............................................  Date                                               Time          22EPIC Rev 08/18       
aerobic capacity/endurance/gait, locomotion, and balance/muscle strength

## 2025-03-26 ENCOUNTER — HOSPITAL ENCOUNTER (EMERGENCY)
Facility: OTHER | Age: 40
Discharge: HOME | End: 2025-03-26
Attending: STUDENT IN AN ORGANIZED HEALTH CARE EDUCATION/TRAINING PROGRAM

## 2025-03-26 ENCOUNTER — APPOINTMENT (OUTPATIENT)
Dept: GENERAL RADIOLOGY | Facility: OTHER | Age: 40
End: 2025-03-26
Attending: STUDENT IN AN ORGANIZED HEALTH CARE EDUCATION/TRAINING PROGRAM

## 2025-03-26 VITALS
OXYGEN SATURATION: 94 % | SYSTOLIC BLOOD PRESSURE: 132 MMHG | RESPIRATION RATE: 11 BRPM | HEART RATE: 66 BPM | DIASTOLIC BLOOD PRESSURE: 121 MMHG | TEMPERATURE: 97.7 F

## 2025-03-26 DIAGNOSIS — R07.2 SUBSTERNAL CHEST PAIN: ICD-10-CM

## 2025-03-26 LAB
ANION GAP SERPL CALCULATED.3IONS-SCNC: 13 MMOL/L (ref 7–15)
BASOPHILS # BLD AUTO: 0.1 10E3/UL (ref 0–0.2)
BASOPHILS NFR BLD AUTO: 1 %
BUN SERPL-MCNC: 18.3 MG/DL (ref 6–20)
CALCIUM SERPL-MCNC: 9.3 MG/DL (ref 8.8–10.4)
CHLORIDE SERPL-SCNC: 103 MMOL/L (ref 98–107)
CREAT SERPL-MCNC: 0.91 MG/DL (ref 0.67–1.17)
D DIMER PPP FEU-MCNC: <=0.27 UG/ML FEU (ref 0–0.5)
EGFRCR SERPLBLD CKD-EPI 2021: >90 ML/MIN/1.73M2
EOSINOPHIL # BLD AUTO: 0.2 10E3/UL (ref 0–0.7)
EOSINOPHIL NFR BLD AUTO: 2 %
ERYTHROCYTE [DISTWIDTH] IN BLOOD BY AUTOMATED COUNT: 12.1 % (ref 10–15)
GLUCOSE SERPL-MCNC: 137 MG/DL (ref 70–99)
HCO3 SERPL-SCNC: 21 MMOL/L (ref 22–29)
HCT VFR BLD AUTO: 42 % (ref 40–53)
HGB BLD-MCNC: 14.7 G/DL (ref 13.3–17.7)
IMM GRANULOCYTES # BLD: 0 10E3/UL
IMM GRANULOCYTES NFR BLD: 0 %
LYMPHOCYTES # BLD AUTO: 3.4 10E3/UL (ref 0.8–5.3)
LYMPHOCYTES NFR BLD AUTO: 34 %
MCH RBC QN AUTO: 30.4 PG (ref 26.5–33)
MCHC RBC AUTO-ENTMCNC: 35 G/DL (ref 31.5–36.5)
MCV RBC AUTO: 87 FL (ref 78–100)
MONOCYTES # BLD AUTO: 0.7 10E3/UL (ref 0–1.3)
MONOCYTES NFR BLD AUTO: 7 %
NEUTROPHILS # BLD AUTO: 5.5 10E3/UL (ref 1.6–8.3)
NEUTROPHILS NFR BLD AUTO: 56 %
NRBC # BLD AUTO: 0 10E3/UL
NRBC BLD AUTO-RTO: 0 /100
PLATELET # BLD AUTO: 211 10E3/UL (ref 150–450)
POTASSIUM SERPL-SCNC: 4.2 MMOL/L (ref 3.4–5.3)
RBC # BLD AUTO: 4.84 10E6/UL (ref 4.4–5.9)
SODIUM SERPL-SCNC: 137 MMOL/L (ref 135–145)
TROPONIN T SERPL HS-MCNC: 12 NG/L
TROPONIN T SERPL HS-MCNC: 13 NG/L
TSH SERPL DL<=0.005 MIU/L-ACNC: 2.37 UIU/ML (ref 0.3–4.2)
WBC # BLD AUTO: 9.9 10E3/UL (ref 4–11)

## 2025-03-26 PROCEDURE — 84484 ASSAY OF TROPONIN QUANT: CPT | Performed by: STUDENT IN AN ORGANIZED HEALTH CARE EDUCATION/TRAINING PROGRAM

## 2025-03-26 PROCEDURE — 71045 X-RAY EXAM CHEST 1 VIEW: CPT

## 2025-03-26 PROCEDURE — 36415 COLL VENOUS BLD VENIPUNCTURE: CPT | Performed by: STUDENT IN AN ORGANIZED HEALTH CARE EDUCATION/TRAINING PROGRAM

## 2025-03-26 PROCEDURE — 82374 ASSAY BLOOD CARBON DIOXIDE: CPT | Performed by: STUDENT IN AN ORGANIZED HEALTH CARE EDUCATION/TRAINING PROGRAM

## 2025-03-26 PROCEDURE — 99284 EMERGENCY DEPT VISIT MOD MDM: CPT | Performed by: STUDENT IN AN ORGANIZED HEALTH CARE EDUCATION/TRAINING PROGRAM

## 2025-03-26 PROCEDURE — 84443 ASSAY THYROID STIM HORMONE: CPT | Performed by: STUDENT IN AN ORGANIZED HEALTH CARE EDUCATION/TRAINING PROGRAM

## 2025-03-26 PROCEDURE — 93005 ELECTROCARDIOGRAM TRACING: CPT | Performed by: STUDENT IN AN ORGANIZED HEALTH CARE EDUCATION/TRAINING PROGRAM

## 2025-03-26 PROCEDURE — 80048 BASIC METABOLIC PNL TOTAL CA: CPT | Performed by: STUDENT IN AN ORGANIZED HEALTH CARE EDUCATION/TRAINING PROGRAM

## 2025-03-26 PROCEDURE — 99291 CRITICAL CARE FIRST HOUR: CPT | Mod: 25 | Performed by: STUDENT IN AN ORGANIZED HEALTH CARE EDUCATION/TRAINING PROGRAM

## 2025-03-26 PROCEDURE — 93010 ELECTROCARDIOGRAM REPORT: CPT | Performed by: INTERNAL MEDICINE

## 2025-03-26 PROCEDURE — 85004 AUTOMATED DIFF WBC COUNT: CPT | Performed by: STUDENT IN AN ORGANIZED HEALTH CARE EDUCATION/TRAINING PROGRAM

## 2025-03-26 PROCEDURE — 85379 FIBRIN DEGRADATION QUANT: CPT | Performed by: STUDENT IN AN ORGANIZED HEALTH CARE EDUCATION/TRAINING PROGRAM

## 2025-03-26 ASSESSMENT — COLUMBIA-SUICIDE SEVERITY RATING SCALE - C-SSRS
1. IN THE PAST MONTH, HAVE YOU WISHED YOU WERE DEAD OR WISHED YOU COULD GO TO SLEEP AND NOT WAKE UP?: NO
6. HAVE YOU EVER DONE ANYTHING, STARTED TO DO ANYTHING, OR PREPARED TO DO ANYTHING TO END YOUR LIFE?: NO
2. HAVE YOU ACTUALLY HAD ANY THOUGHTS OF KILLING YOURSELF IN THE PAST MONTH?: NO

## 2025-03-26 ASSESSMENT — ACTIVITIES OF DAILY LIVING (ADL)
ADLS_ACUITY_SCORE: 41

## 2025-03-26 NOTE — ED TRIAGE NOTES
Pt arrives with concerns of mid sternal chest pain that gets worse with activity. He states this pain started about three days ago and hasn't gone away. Pt is short of breath and does have a history of graves disease. He states his legs have been going numb when the pain gets bad.   BP (!) 147/105   Pulse 87   Temp 97.7  F (36.5  C) (Tympanic)   Resp 20   SpO2 96%       Triage Assessment (Adult)       Row Name 03/26/25 0900          Triage Assessment    Airway WDL WDL        Respiratory WDL    Respiratory WDL X        Skin Circulation/Temperature WDL    Skin Circulation/Temperature WDL WDL        Cardiac WDL    Cardiac WDL X        Peripheral/Neurovascular WDL    Peripheral Neurovascular WDL WDL        Cognitive/Neuro/Behavioral WDL    Cognitive/Neuro/Behavioral WDL WDL

## 2025-03-26 NOTE — ED PROVIDER NOTES
History     Chief Complaint   Patient presents with    Chest Pain    Shortness of Breath       Hong Murillo is a 39 year old male presenting for chest pain.  Onset approximately 4 days ago while at rest.  Located substernally without radiation.  Associated symptoms can include a mild cough and sense of shortness of breath and mild nausea.  Pain worsens with upright positioning, therefore he has been laying down or reclined over most of the weekend.  He does have a history of back pain and initially thought maybe it was due to this.  Today 1 hour prior to presentation he was standing up and upon standing up he had a sharp severe substernal pain.  He sat down and improved, but then sharp pain returned upon standing.  Currently denies any pain here in the ED while in recumbent position.  He also reported a sense of bilateral numbness in his legs, like a sensation that the legs are just about ready to fall asleep.  History includes Graves' but states now it is Hashimoto's, on levothyroxine with no recent dose changes.  Denies fever, chills, diaphoresis, lightheadedness, leg swelling. Denies recent trauma, immobilization in past 4 weeks, hormone therapy, new activities, or PE/DVT history.  Denies similar symptoms in the past.     No Known Allergies    Patient Active Problem List    Diagnosis Date Noted    Graves disease 03/06/2017     Priority: Medium    NORM (generalized anxiety disorder) 03/06/2017     Priority: Medium    ACP (advance care planning) 12/15/2016     Priority: Medium     Advance Care Planning 12/15/2016: ACP Review of Chart / Resources Provided:  Reviewed chart for advance care plan.  Hong Murillo has no plan or code status on file. Discussed available resources and provided with information. Confirmed code status reflects current choices pending further ACP discussions.  Confirmed/documented legally designated decision makers.  Added by Zahraa Sanchez            Leukocytosis 06/13/2015      Priority: Medium    Hypokalemia 06/13/2015     Priority: Medium    Migraine without aura and with status migrainosus, not intractable 06/13/2015     Priority: Medium    Ilio-inguinal strain 06/18/2013     Priority: Medium       No past medical history on file.    No past surgical history on file.    No family history on file.    Social History     Tobacco Use    Smoking status: Every Day     Current packs/day: 0.50     Average packs/day: 0.5 packs/day for 15.0 years (7.5 ttl pk-yrs)     Types: Cigarettes     Passive exposure: Current    Smokeless tobacco: Never    Tobacco comments:     Ready to quit again   Vaping Use    Vaping status: Never Used   Substance Use Topics    Alcohol use: No     Comment: rarely    Drug use: Yes     Comment: marijuana- last time used yesterday       Medications:    clonazePAM (KLONOPIN) 1 MG tablet  escitalopram (LEXAPRO) 10 MG tablet  hydrochlorothiazide (MICROZIDE) 12.5 MG capsule  ibuprofen (ADVIL/MOTRIN) 800 MG tablet  levothyroxine (SYNTHROID/LEVOTHROID) 150 MCG tablet  metoprolol tartrate (LOPRESSOR) 50 MG tablet        Review of Systems: See HPI for pertinent negatives and positives. All other systems reviewed and found to be negative.    Physical Exam   BP (!) 132/121   Pulse 66   Temp 97.7  F (36.5  C) (Tympanic)   Resp 11   SpO2 94%      General: awake, comfortable  HEENT: atraumatic, no JVD  Respiratory: normal effort, clear to auscultation bilaterally  Cardiovascular: regular rate and rhythm, no murmurs, symmetric radial and dorsalis pedis pulses 2+  Abdomen: soft, nontender, nondistended  Extremities: no deformities, edema, tenderness  MSK: no chest wall tenderness  Skin: warm, dry, no rashes  Neuro: alert, no focal deficits    ED Course      Results for orders placed or performed during the hospital encounter of 03/26/25 (from the past 24 hours)   CBC with Platelets & Differential    Narrative    The following orders were created for panel order CBC with Platelets &  Differential.  Procedure                               Abnormality         Status                     ---------                               -----------         ------                     CBC with platelets and ...[[id number]]                      Final result                 Please view results for these tests on the individual orders.   Basic metabolic panel   Result Value Ref Range    Sodium 137 135 - 145 mmol/L    Potassium 4.2 3.4 - 5.3 mmol/L    Chloride 103 98 - 107 mmol/L    Carbon Dioxide (CO2) 21 (L) 22 - 29 mmol/L    Anion Gap 13 7 - 15 mmol/L    Urea Nitrogen 18.3 6.0 - 20.0 mg/dL    Creatinine 0.91 0.67 - 1.17 mg/dL    GFR Estimate >90 >60 mL/min/1.73m2    Calcium 9.3 8.8 - 10.4 mg/dL    Glucose 137 (H) 70 - 99 mg/dL   D dimer quantitative   Result Value Ref Range    D-Dimer Quantitative <=0.27 0.00 - 0.50 ug/mL FEU    Narrative    This D-dimer assay is intended for use in conjunction with a clinical pretest probability assessment model to exclude pulmonary embolism (PE) and deep venous thrombosis (DVT) in outpatients suspected of PE or DVT. The cut-off value is 0.50 ug/mL FEU.   Troponin T, High Sensitivity   Result Value Ref Range    Troponin T, High Sensitivity 12 <=22 ng/L   CBC with platelets and differential   Result Value Ref Range    WBC Count 9.9 4.0 - 11.0 10e3/uL    RBC Count 4.84 4.40 - 5.90 10e6/uL    Hemoglobin 14.7 13.3 - 17.7 g/dL    Hematocrit 42.0 40.0 - 53.0 %    MCV 87 78 - 100 fL    MCH 30.4 26.5 - 33.0 pg    MCHC 35.0 31.5 - 36.5 g/dL    RDW 12.1 10.0 - 15.0 %    Platelet Count 211 150 - 450 10e3/uL    % Neutrophils 56 %    % Lymphocytes 34 %    % Monocytes 7 %    % Eosinophils 2 %    % Basophils 1 %    % Immature Granulocytes 0 %    NRBCs per 100 WBC 0 <1 /100    Absolute Neutrophils 5.5 1.6 - 8.3 10e3/uL    Absolute Lymphocytes 3.4 0.8 - 5.3 10e3/uL    Absolute Monocytes 0.7 0.0 - 1.3 10e3/uL    Absolute Eosinophils 0.2 0.0 - 0.7 10e3/uL    Absolute Basophils 0.1 0.0 - 0.2  10e3/uL    Absolute Immature Granulocytes 0.0 <=0.4 10e3/uL    Absolute NRBCs 0.0 10e3/uL   TSH Reflex GH   Result Value Ref Range    TSH 2.37 0.30 - 4.20 uIU/mL   XR Chest Port 1 View    Narrative    PROCEDURE: XR CHEST PORT 1 VIEW 3/26/2025 9:31 AM    HISTORY: substernal CP several days, worse with upright positioning    COMPARISONS: None.    TECHNIQUE: Single portable view.    FINDINGS: Heart is not enlarged. There is mild interstitial prominence  without confluent infiltrate or pleural effusion.         Impression    IMPRESSION: Mild interstitial prominence. Without prior images mild  interstitial infiltrate or edema cannot be excluded.    EVELIO FARIAS MD         SYSTEM ID:  [id number]   Troponin T, High Sensitivity   Result Value Ref Range    Troponin T, High Sensitivity 13 <=22 ng/L       Medications - No data to display    Assessments & Plan (with Medical Decision Making)     I have reviewed the nursing notes.    ED Course as of 03/26/25 1129   Wed Mar 26, 2025   0916 EKG personally interpreted as: NSR, no evidence of acute ischemia with no ST abnormalities, LBBB, I/II/V4-6 TWI, hyperacute T waves.    1010 Discussed chest x-ray mild interstitial prominence interpretation with radiology who did not feel like this was related to chest pain.  Differential includes mild fibrosis, mild pulmonary disease, other.   1014 Troponin T, High Sensitivity: 12  Due to sudden worsening of chest pain 1 hour prior to presentation will get serial troponin. First troponin at past baseline.        39 year old male evaluated for chest pain onset several days ago but then worsening status 1 hour prior to presentation. Differential includes acute coronary syndrome, pulmonary embolism, aortic dissection, pneumothorax, esophageal tear. Hemodynamically stable. EKG, CXR, and laboratory evaluation were unremarkable. Pain was resolved by the time he arrived and remained absent.  Offered dose of Toradol here prior to going home to see  if this would dampen or alleviate any future chest pains, but patient declined.  We discussed short course of NSAIDs to see if this helps. Given low risk with evaluation results suggesting low suspicion of a life threatening etiology, patient is stable for outpatient management with close follow up. Reassurance provided for unlikely immediately life-threatening serious cause for chest pain.  Patient also requested TSH test as he is on Synthroid with history of Graves' but then states this converted to Hashimoto TSH normal and reassurance provided for this as well.  Discharged home in stable condition with attached instructions on diagnosis including ED return precautions and instructed PCP follow up in 1-2 weeks. Discharged home in stable condition.    I have reviewed the findings, diagnosis, plan and need for follow up with the patient.      New Prescriptions    No medications on file       Final diagnoses:   Substernal chest pain       3/26/2025   [facility]       Yoshi Crystal MD  03/26/25 1126

## 2025-03-26 NOTE — DISCHARGE INSTRUCTIONS
Evaluation today was reassuring for unlikely immediately life-threatening or serious cause for your chest pain. Your thyroid stimulating hormone level was normal.     You may want to try a short course of ibuprofen for a couple days to see if this helps. Always take with food to avoid stomach ulcers/bleed.    Please follow up with a PCP (primary care provider) in the next 1-2 weeks.     Please review attached instructions including reasons to return to the emergency department.

## 2025-03-27 LAB
ATRIAL RATE - MUSE: 83 BPM
DIASTOLIC BLOOD PRESSURE - MUSE: NORMAL MMHG
INTERPRETATION ECG - MUSE: NORMAL
P AXIS - MUSE: 39 DEGREES
PR INTERVAL - MUSE: 178 MS
QRS DURATION - MUSE: 102 MS
QT - MUSE: 366 MS
QTC - MUSE: 430 MS
R AXIS - MUSE: 47 DEGREES
SYSTOLIC BLOOD PRESSURE - MUSE: NORMAL MMHG
T AXIS - MUSE: 18 DEGREES
VENTRICULAR RATE- MUSE: 83 BPM

## 2025-05-09 DIAGNOSIS — E03.9 ACQUIRED HYPOTHYROIDISM: ICD-10-CM

## 2025-05-12 RX ORDER — LEVOTHYROXINE SODIUM 150 UG/1
150 TABLET ORAL DAILY
Qty: 90 TABLET | Refills: 0 | Status: CANCELLED | OUTPATIENT
Start: 2025-05-12

## 2025-05-12 NOTE — TELEPHONE ENCOUNTER
Attempt # 1  Outcome: Left Message   Comment: LVM asking patient to call and make establish care visit with new provider.

## 2025-05-14 NOTE — TELEPHONE ENCOUNTER
Attempt # 2  Outcome: Talked with Patient    Comment: Pt has moved and will be going to the [facility] for his primary care.

## (undated) RX ORDER — DIPHENHYDRAMINE HYDROCHLORIDE 50 MG/ML
INJECTION INTRAMUSCULAR; INTRAVENOUS
Status: DISPENSED
Start: 2022-06-12

## (undated) RX ORDER — SODIUM CHLORIDE 9 MG/ML
INJECTION, SOLUTION INTRAVENOUS
Status: DISPENSED
Start: 2019-11-03

## (undated) RX ORDER — DIPHENHYDRAMINE HYDROCHLORIDE 50 MG/ML
INJECTION INTRAMUSCULAR; INTRAVENOUS
Status: DISPENSED
Start: 2019-11-03